# Patient Record
Sex: FEMALE | Race: WHITE
[De-identification: names, ages, dates, MRNs, and addresses within clinical notes are randomized per-mention and may not be internally consistent; named-entity substitution may affect disease eponyms.]

---

## 2017-09-19 ENCOUNTER — HOSPITAL ENCOUNTER (OUTPATIENT)
Dept: HOSPITAL 84 - OBSVTIME | Age: 57
LOS: 2 days | Discharge: HOME | End: 2017-09-21
Attending: INTERNAL MEDICINE
Payer: MEDICARE

## 2017-09-19 VITALS — DIASTOLIC BLOOD PRESSURE: 58 MMHG | SYSTOLIC BLOOD PRESSURE: 126 MMHG

## 2017-09-19 VITALS — SYSTOLIC BLOOD PRESSURE: 130 MMHG | DIASTOLIC BLOOD PRESSURE: 70 MMHG

## 2017-09-19 VITALS — DIASTOLIC BLOOD PRESSURE: 50 MMHG | SYSTOLIC BLOOD PRESSURE: 102 MMHG

## 2017-09-19 VITALS — DIASTOLIC BLOOD PRESSURE: 49 MMHG | SYSTOLIC BLOOD PRESSURE: 118 MMHG

## 2017-09-19 VITALS — DIASTOLIC BLOOD PRESSURE: 71 MMHG | SYSTOLIC BLOOD PRESSURE: 94 MMHG

## 2017-09-19 DIAGNOSIS — I25.119: Primary | ICD-10-CM

## 2017-09-19 DIAGNOSIS — Z01.812: ICD-10-CM

## 2017-09-19 DIAGNOSIS — R06.02: ICD-10-CM

## 2017-09-19 DIAGNOSIS — E11.9: ICD-10-CM

## 2017-09-19 DIAGNOSIS — R07.9: ICD-10-CM

## 2017-09-19 DIAGNOSIS — I10: ICD-10-CM

## 2017-09-19 DIAGNOSIS — E78.5: ICD-10-CM

## 2017-09-19 DIAGNOSIS — F17.200: ICD-10-CM

## 2017-09-19 LAB
ANION GAP SERPL CALC-SCNC: 8 MMOL/L (ref 8–16)
BASOPHILS NFR BLD AUTO: 0.6 % (ref 0–2)
BUN SERPL-MCNC: 24 MG/DL (ref 7–18)
CALCIUM SERPL-MCNC: 9.1 MG/DL (ref 8.5–10.1)
CHLORIDE SERPL-SCNC: 105 MMOL/L (ref 98–107)
CO2 SERPL-SCNC: 31.4 MMOL/L (ref 21–32)
CREAT SERPL-MCNC: 1.6 MG/DL (ref 0.6–1.3)
EOSINOPHIL NFR BLD: 1.9 % (ref 0–7)
ERYTHROCYTE [DISTWIDTH] IN BLOOD BY AUTOMATED COUNT: 14.9 % (ref 11.5–14.5)
GLUCOSE SERPL-MCNC: 252 MG/DL (ref 74–106)
HCT VFR BLD CALC: 34 % (ref 36–48)
HGB BLD-MCNC: 11.1 G/DL (ref 12–16)
IMM GRANULOCYTES NFR BLD: 0.4 % (ref 0–5)
LYMPHOCYTES NFR BLD AUTO: 29.1 % (ref 15–50)
MCH RBC QN AUTO: 32.2 PG (ref 26–34)
MCHC RBC AUTO-ENTMCNC: 32.6 G/DL (ref 31–37)
MCV RBC: 98.6 FL (ref 80–100)
MONOCYTES NFR BLD: 9 % (ref 2–11)
NEUTROPHILS NFR BLD AUTO: 59 % (ref 40–80)
OSMOLALITY SERPL CALC.SUM OF ELEC: 291 MOSM/KG (ref 275–300)
PLATELET # BLD: 124 10X3/UL (ref 130–400)
PMV BLD AUTO: 9.8 FL (ref 7.4–10.4)
POTASSIUM SERPL-SCNC: 4.4 MMOL/L (ref 3.5–5.1)
RBC # BLD AUTO: 3.45 10X6/UL (ref 4–5.4)
SODIUM SERPL-SCNC: 140 MMOL/L (ref 136–145)
WBC # BLD AUTO: 7 10X3/UL (ref 4.8–10.8)

## 2017-09-19 PROCEDURE — 92920 PRQ TRLUML C ANGIOP 1ART&/BR: CPT

## 2017-09-19 PROCEDURE — 92978 ENDOLUMINL IVUS OCT C 1ST: CPT

## 2017-09-19 PROCEDURE — 93458 L HRT ARTERY/VENTRICLE ANGIO: CPT

## 2017-09-19 NOTE — HEMODYNAMI
PATIENT:JASMIN VILLAVICENCIO                                    MEDICAL RECORD: [id number]
: 60                                            [id number]                                       ADMISSION DATE: 17
 
 
 Generatedon:20178:27
Patient name: JASMIN VILLAVICENCIO Patient #: [id number] Visit #: [id number] SSN: [id number]
- :
1960 Date of study: 2017
Page: Of
Hemodynamic Procedure Report
****************************
Patient Data
Patient Demographics
Procedure consent was obtained
First Name: JASMIN          Gender: Female
Last Name: SAUD           : 1960
Patient #: [id number]       Age: 57 year(s)
Visit #: [id number]       Race: 
SSN: 
Accession #:
[id number]
Additional ID: [id number]
Contact details
Address: [address] [place] [address]     Phone: [phone]
State: [place]
City: [place]
Zip code: [address]
Past Medical History
Allergies
Allergen        Reaction        Date         Comments
Reported
Other allergy                   2017    Aleve, Hydrocodone
Admission
Admission Data
Admission Date: 2017   Admission Time: 4:01
Arrival Date: 2017     Arrival Time: 4:01
Admit Source: Other         Insurance Payor: Medicare
Room #: D.2119
Lab Results
Lab Result Date: 2017  Lab Result Time: 0:00
Biochemistry
Name         Units    Result                Min      Max
BUN          mg/dl    24       --(----)-*   7        18
Creatinine   mg/dl    1.6      --(----)-*   0.6      1.3
CBC
Name         Units    Result                Min      Max
Hemoglobin   g/dl     11.1     *-(----)--   13.5     17.5
Procedure
Procedure Types
Cath Procedure
PCI Procedure
Coronary Stent Initial
Miscellaneous Procedures
Moderate Sedation up to 30 minutes
Procedure Description
Procedure Date
Procedure Date: 2017
Procedure Start Time: 8:14
 
Procedure End Time: 8:25
Procedure Staff
Name                            Function
Partha Hoyt MD                Performing Physician
Angie Vasquez RT               Scrub
George Pitts RT                  Scrub
Krishna Chapa RN              Nurse
Kirsten Villarreal RT                    Monitor
Indication
Angina
Procedure Data
Cath Procedure
Fluoroscopy
Diagnostic fluoroscopy      Total fluoroscopy Time: 3.1
time: 3.1 min               min
Diagnostic fluoroscopy      Total fluoroscopy dose: 292
dose: 292 mGy               mGy
Contrast Material
Contrast Material Type                       Amount (ml)
Isovue 300                                   35
Entry Location
Entry     Primary  Successful  Side  Size  Upsize Upsize Entry    Closure Succes
sful  Closure
Location                             (Fr)  1 (Fr) 2 (Fr) Remarks  Device        
      Remarks
Femoral                        Left  6 Fr                         Exoseal
artery                               Short
Estimated blood loss: 5 ml
Procedure Complications
No complications
Procedure Medications
Medication           Administration Route Dosage
0.9% NaCl            I.V.                 100 ml/hr
Oxygen               NC                   2 l/min
Heparin Flush Bag    added to field       2 bags
(1000units/500ml NS)
Lidocaine 2%         added to field       20
Versed               I.V.                 1 mg
Fentanyl             I.V.                 50 mcg
Versed               I.V.                 1 mg
Fentanyl             I.V.                 50 mcg
Heparin Bolus        I.V.                 4000 units
Hemodynamics
Rest
HGB: 11.1 (g/dl) Heart Rate: 79 (bpm)
Snapshots
Pre Cath      Intra         NCS           Post Cath
Vital Signs
Time    Heart  Resp   SPO2 etCO2   EF0nusx NIBP (mmHg) Rhythm  Pain    Sedation
Rate   (ipm)  (%)  (mmHg)  (mmHg)                      Status  Level
(bpm)
8:00:46 78     10     100  0       0       146/74(104) NSR     0 (11)  10(A)
, No
pain
8:05:35 79     16     97   0       0       133/49(126) NSR     0 (11)  10(A)
, No
pain
8:10:13 80     16     98   0       0       93/63(72)   NSR     0 (11)  9(A)
, No
pain
 
8:15:31 78     14     98   0       0       121/46(64)  NSR     0 (11)  9(A)
, No
pain
8:20:19 80     14     97   0       0       114/41(62)  NSR     0 (11)  9(A)
, No
pain
8:25:06 80     16     99   0       0       113/44(66)  NSR     0 (11)  9(A)
, No
pain
Medications
Time    Medication       Route  Dose  Verified Delivered Reason          Notes  
Effectiveness
by       by
7:58:35 0.9% NaCl        I.V.   100   Krishna  Krishna   Per physician
ml/hr Eduard Chapa RN       RN
7:58:51 Oxygen           NC     2     Krishna  Krishna   Per physician
l/min Eduard Chapa RN       RN
7:59:11 Heparin Flush    added  2     Krishna  Krishna   used for
Bag              to     bags  Eduard Chapa   procedure
(1000units/500ml field        RN       RN
NS)
7:59:28 Lidocaine 2%     added  20ml  Krishna  Krishna   for local
to     vial  Eduard Chapa   anesthetic
field        RN       RN
8:04:24 Versed           I.V.   1 mg  Krishna  Krishna   for sedation
Eduard Chapa
RN       RN
8:04:35 Fentanyl         I.V.   50    Krishna  Krishna   for sedation
mcg   Eduard Chapa
RN       RN
8:14:47 Versed           I.V.   1 mg  Krishna  Krishna   for sedation
Eduard Chapa
RN       RN
8:14:59 Fentanyl         I.V.   50    Krishna  Krishna   for sedation
mcg   Eduard Chapa
RN       RN
8:18:40 Heparin Bolus    I.V.   4000  Krishna  Krishna   for
units Eduard Chapa   anticoagulation
RN       RN
Procedure Log
Time     Note
7:42:06  Informed consent obtained and on chart
7:42:11  Diagnostic Cath Status : Elective
7:42:39  Indication : Angina
7:42:44  George Pitts RT(R) sent for patient. Start room use.
7:42:45  Time tracking: Regular hours
7:42:50  Plan of Care:Hemodynamics will remain stable., Cardiac
rhythm will remain stable., Comfort level will be
maintained., Respiratory function will remain
adequate., Patient/ family verbilizes understanding of
procedure., Procedure tolerated without complication.,
Recovers from procedure without complications..
7:45:26  Admit Source: Other
7:45:34  Arrival Date: 2017 4:01:00 AM
7:45:45  Insurance Payor : Medicare
7:46:37  Lab Result : Hemoglobin 11.1 g/dl
7:46:37  Lab Result : Creatinine 1.6 mg/dl
7:46:37  Lab Result : BUN 24 mg/dl
 
7:53:43  Patient received from Med II to CCL 1 Alert and
oriented. Tansferred to table in Supine position.
7:53:44  Warm blankets applied, and lili hugger turned on for
patient comfort.
7:53:45  Correct patient and procedure confirmed by team.
7:53:45  ECG and BP/O2 sat monitors applied to patient.
7:58:35  0.9% NaCl 100 ml/hr I.V. was administered by Krishna Chapa RN; Per physician;
7:58:51  Oxygen 2 l/min NC was administered by Krishna Chapa
RN; Per physician;
7:59:11  Heparin Flush Bag (1000units/500ml NS) 2 bags added to
field was administered by Krishna Chapa RN; used for
procedure;
7:59:28  Lidocaine 2% 20ml vial added to field was administered
by Krishna Chapa RN; for local anesthetic;
7:59:47  Vital chart was started
8:00:50  Baseline sample Acquired.
8:00:55  Rhythm: sinus rhythm
8:00:56  Full Disclosure recording started
8:01:07  H&P Date Dictated: 2017 Within 30 days and on
chart., H&P Addendum completed by physician on day of
procedure. (MUST COMPLETE FOR ALL OUTPATIENTS).
8:01:15  Pre-procedure instructions explained to patient.
8:01:16  Pre-op teaching completed and patient verbalized
understanding.
8:01:24  Family in patients room.
8:01:25  Patient NPO since Midnight.
8:01:27  Is the patient allergic to Iodine/contrast media? No.
8:01:29  Is patient on blood thinner?Yes
8:01:31  **ACC** The patient was administered the following
blood thiners within the last 24 hours: **ACC**Plavix
8:01:34  Patient diabetic? Yes.
8:01:36  If diabetic: On Metformin? No
8:01:39  Previous problem with sedation/anesthesia? No ?
8:01:42  Snore? Yes
8:01:48  Sleep apnea? Yes
8:01:49  Deviated septum? No
8:01:50  Opens mouth fully? Yes
8:01:50  Sticks out tongue? Yes
8:01:55  Airway obstruction? Yes COPD
8:02:13  Dentures? No ?
8:02:16  Pre procedure: left dorsailis pedis pulse 1+ Palpable,
but thready & weak; easily obliterated
8:02:24  Patient pain scale 0/10 ?.
8:02:29  IV patent on arrival in right forearm with 0.9% NaCl
at [facility].
8:02:42  Lab results completed and on chart.
8:02:45  Left groin area was prepped with chlora-prep and
draped in sterile fashion
8:03:43  Pt's abdomen was taped back for groin access.
8:03:45  Alarms reviewed by R. N.
8:03:46  Sharps counted by scrub and verified by R.N.
8:03:49  --------ALL STOP TIME OUT------
8:03:50  Final Timeout: patient, procedure, and site verified
with staff and physician. All members of the team are
in agreement.
8:03:54  Left groin site verified by team.
8:03:57  Physical assessment completed. ASA score P 2 - A
patient with mild systemic disease as per Partha Hoyt MD.
 
8:04:00  Sedation plan: IV Moderate Sedation Versed, Fentanyl
8:04:24  Versed 1 mg I.V. was administered by Krishna Chapa
RN; for sedation;
8:04:35  Fentanyl 50 mcg I.V. was administered by Krishna Chapa RN; for sedation;
8:06:29  Use device set Femoral PCI
8:06:31  Acist Syringe opened to sterile field.
8:06:31  Acist Hand Control opened to sterile field.
8:06:31  Bag Decanter opened to sterile field.
8:06:32  Medline Cath Pack opened to sterile field.
8:06:32  Terumo 6Fr [place] Sheath opened to sterile field.
8:06:32  St Hany 260cm J .035 wire opened to sterile field.
8:06:33  Merit BasixCompak Inflation Kit opened to sterile
field.
8:06:33  Acist Manifold opened to sterile field.
8:06:34  Tegaderm 4 x 4 opened to sterile field.
8:09:58  Zero performed for pressure channel P1
8:10:06  Zero performed for pressure channel P1
8:10:22  PCI Cath status Elective
8:14:37  Chicas Whisper J 300cm 0.014 guide wire opened to
sterile field.
8:14:38  Cordis 6FR XBLAD 4.0 guide catheter opened to sterile
field.
8:14:41  Procedure started.
8:14:46  Local anesthetic to left femerol artery with Lidocaine
2% by Partha Hoyt MD.**INITIAL ACCESS ONLY**
8:14:47  Versed 1 mg I.V. was administered by Krishna Chapa RN; for sedation;
8:14:55  A 6 Fr Short sheath was inserted into the Left Femoral
artery
8:14:59  Fentanyl 50 mcg I.V. was administered by Krishna Chapa RN; for sedation;
8:16:27  6 Fr xblad 4 guide catheter was inserted over the wire
8:17:12  whisper wire advanced.
8:18:40  Heparin Bolus 4000 units I.V. was administered by
Krishna Chapa RN; for anticoagulation;
8:18:54  Wire advanced across lesion.
8:20:39  Inflation Number: 1 A Rakesh OTW 3.0 x 08 stent was
prepped and advanced across the Prox CX. The stent was
deployed at 15 FERN for 0:10 (min:sec).
8:21:16  Wire redirected to lad.
8:22:15  Inflation number: 1 The stent balloon was then
re-inflated across the Prox LAD to 11 FERN for 0:10
(min:sec).
8:22:20  Stent catheter was removed intact over wire.
8:22:20  Wire removed.
8:22:21  Guide catheter removed.
8:22:33  Cordis 6Fr Exoseal opened to sterile field.
8:22:45  Sheath removed intact; hemostasis achieved with
Exoseal to the Left Femoral artery.
8:22:47  Procedure ended.(Physican Out)
8:23:31  Fluoroscopy time 03.10 minutes.
8:23:35  Flurop Dose total: 292
8:23:35  Fluoroscopy dose: 292 mGy
8:23:40  Contrast amount:Isovue 300 35ml.
8:23:42  Sharps counted by scrub and verified by R.N.
8:23:43  Insertion/operative site no bleeding no hematoma.
8:23:46  Post-op/insertion site Left Femoral artery dressed
using a 4 x 4 and Tegaderm.
8:23:50  Post left femerol artery:stable
 
8:23:52  Post Procedure Pulses reassessed and unchanged
8:23:58  Post procedure rhythm: unchanged.
8:24:01  Estimated blood loss: 5 ml
8:24:02  Post procedure instruction explained to
patient.Patient verbalizes understanding.
8:24:03  Patient needs reinforcement of post procedure
teaching.
8:24:40  Procedure type changed to Cath procedure, PCI
procedure, Coronary Stent Initial, Miscellaneous
Procedures, Moderate Sedation up to 30 minutes
8:25:19  Procedure and supply charges have been captured,
reviewed, submitted and are correct.
8:25:24  Procedure Complication : No complications
8::41  Vital chart was stopped
8:25:41  See physician's report for complete and final results.
8:25:44  Report given to [facility].
8:25:47  Patient transfered to [facility] with Stretcher.
8:25:48  Procedure ended.
8:25:48  Full Disclosure recording stopped
8:25:55  **ACC-PCI Only** Patient was given prescriptions, or
instructed by Partha Hoyt MD to start/continue the
following medications upon discharge: Plavix
8:25:57  End room use (Document Last)
Intervention Summary
Intervention Notes
Time    ActionType  Lesion and  Equipment Action#  Pressure  Duration
Attributes  Used
8:20:39 Place stent Prox CX     Biggs OTW  1        15        00:10
3.0 x 08
stent
8:22:15 Reinflate   Prox LAD    Rakesh OTW  1        11        00:10
stent                   3.0 x 08
balloon                 stent
Device Usage
Item Name   Manufacture  Quantity  Catalog      Hospital Part     Current Minima
l Lot# /
Number       Charge   Number   Stock   Stock   Serial#
Code
Acist       Acist        1         [id number]        534168   030274   535705  20
Syringe     Medical
[employer]
Acist Hand  Acist        1         83127        142013   085608   891765  5
Control     Medical
System[employer] Inc
Bag         Microtek     1         [id number]        961047   95010    733945  5
[employer]
Medline     Cardinal     1         [id number]    511102   82576    861209  5
Cath Pack   Health
Terumo 6Fr  Terumo       1         [id number]       401124   088690   493159  40
[place]
Sheath
St Hany     St Hany      1         475717       130858   106350   050013  30
260cm J
.035 wire
Merit       Merit        1         [id number]       168043   054658   358806  15
[employer]
Inflation
Kit
Acist       Acist        1         77532        482191   685612   633681  5
[employer]    Medical
 
Systems Inc
Tegaderm 4  3M           1         1626W        118203   459804   386658  5
x 4
Chicas      Chicas       1         [id number]    931797   730875   377270  5
Whisper J   Vascular
300cm 0.014
guide wire
Cordis 6FR  Cardinal     1         [id number]     388287   490763   685836  3
XBLAD 4.0   Health
guide
catheter
Rakesh OTW    [employer]tronic    1         [id number]  397123   2533754  545466  5     
  [id number]
3.0 x 08
stent
Cordis 6Fr  Cardinal     1                 477785   261869   911721  10
[facility]     Health
[facility]
Stage           Time        Signature      Unsigned
Intra-Procedure 2017   Kirsten Villarreal
8:27:47 AM  RT(R)
Signatures
Monitor : Kirsten Villarreal RT   Signature :
______________________________
Date : ______________ Time :
______________
 
 
 
 
 
 
 
 
 
 
 
 
 
 
 
 
 
 
 
 
 
 
 
 
 
 
 
 
 
[place] [address] [facility] [place] [address]

## 2017-09-19 NOTE — HEMODYNAMI
PATIENT:JASMIN VILLAVICENCIO                                    MEDICAL RECORD: [id number]
: 60                                            LOCATION[facility]     D.2119
[id number]                                       ADMISSION DATE: 17
 
 
 Generatedon:201710:51
Patient name: JASMNI VILLAVICENCIO Patient #: [id number] Visit #: [id number] SSN: 
: 1960 Date
of study: 2017
Page: Of
Hemodynamic Procedure Report
****************************
Patient Data
Patient Demographics
Procedure consent was obtained
First Name: JASMIN          Gender: Female
Last Name: SAUD           : 1960
Patient #: [id number]       Age: 57 year(s)
Visit #: [id number]       Race: Unknown
Accession #:
[id number]
Additional ID: [id number]
Contact details
Address: [address] [place] [address]     Phone: [phone]
State: [place]
City: [place]
Zip code: [address]
Past Medical History
Allergies
Allergen        Reaction        Date         Comments
Reported
Other allergy                   2017    Aleve, Hydrocodone
Admission
Admission Data
Admission Date: 2017   Admission Time: 4:01
[facility]
Lab Results
Lab Result Date: 2017  Lab Result Time: 0:00
Biochemistry
Name         Units    Result                Min      Max
Creatinine   mg/dl    1.6      --(----)-*   0.6      1.3
CBC
Name         Units    Result                Min      Max
Hemoglobin   g/dl     11.1     *-(----)--   13.5     17.5
Procedure
Procedure Types
Cath Procedure
Diagnostic Procedure
[facility] w/Coronaries
FFR/IVUS
Intra-Coronary IVUS Initial
PCI Procedure
Coronary Stent Initial
Miscellaneous Procedures
Moderate Sedation up to 30 minutes
Procedure Description
Procedure Date
Procedure Date: 2017
 
Procedure Start Time: 10:28
Procedure End Time: 10:51
Procedure Staff
Name                            Function
Partha Hoyt MD                Performing Physician
Jefferson Gaspar RT                  Scrub
Frances Oden RN                  Nurse
Sneha Leo RT             Monitor
Procedure Data
Cath Procedure
Fluoroscopy
Diagnostic fluoroscopy      Total fluoroscopy Time: 4
time: 4 min                 min
Diagnostic fluoroscopy      Total fluoroscopy dose: 616
dose: 616 mGy               mGy
Contrast Material
Contrast Material Type                       Amount (ml)
Isovue 300                                   94
Entry Location
Entry     Primary  Successful  Side  Size  Upsize Upsize Entry    Closure Succes
sful  Closure
Location                             (Fr)  1 (Fr) 2 (Fr) Remarks  Device        
      Remarks
Femoral                        Right 5 Fr  6 Fr                   Exoseal
artery                                     Short
Estimated blood loss: 10 ml
Diagnostic catheters
Device Type               Used For           End Catheter
Placement
Cordis 5Fr Pigtail        LV Angiography
Catheter (MP)
Cordis 5Fr JL 4.0         Left Coronary
Catheter (MP)             Angiography
Cordis 5Fr 3DRC Catheter  Right Coronary
(MP)                      Angiography
Procedure Complications
No complications
Procedure Medications
Medication           Administration Route Dosage
Oxygen               NC                   2 l/min
0.9% NaCl            I.V.                 100 ml/hr
Lidocaine 2%         added to field       20
Heparin Flush Bag    added to field       2 bags
(1000units/500ml NS)
Versed               I.V.                 1 mg
Fentanyl             I.V.                 50 mcg
Versed               I.V.                 1 mg
Fentanyl             I.V.                 50 mcg
Fentanyl             I.V.                 50 mcg
Heparin Bolus        I.V.                 4000 units
Plavix               P.O.                 75 mg
Hemodynamics
Rest
HGB: 11.1 (g/dl) Heart Rate: 79 (bpm)
Snapshots
Pre Cath      Intra         NCS           Post Cath
Vital Signs
Time     Heart  Resp   SPO2 NIBP (mmHg) Rhythm  Pain    Sedation
Rate   (ipm)  (%)                      Status  Level
(bpm)
 
10:11:43 76     14     99   119/73(107) NSR     0 (11)  10(A)
, No
pain
10:16:42 79     19     100  134/75(112) NSR     0 (11)  10(A)
, No
pain
10:20:54 78     16     99   112/68(104) NSR     0 (11)  10(A)
, No
pain
10:25:04 80     17     96   106/64(80)  NSR     0 (11)  10(A)
, No
pain
10:29:10 81     14     98   98/68(91)   NSR     0 (11)  9(A)
, No
pain
10:33:14 84     15     95   91/73(85)   NSR     0 (11)  9(A)
, No
pain
10:39:14 84     15     97   104/85(101) NSR     0 (11)  9(A)
, No
pain
10:43:18 76     17     98   113/83(107) NSR     0 (11)  9(A)
, No
pain
10:47:26 86     17     98   123/77(92)  NSR     0 (11)  10(A)
, No
pain
10:51:34 85     9      99   126/83(114) NSR     0 (11)  10(A)
, No
pain
Medications
Time     Medication       Route  Dose  Verified Delivered Reason          Notes 
   Effectiveness
by       by
10:10:38 Oxygen           NC     2     Partha  Patriciaie    used for
l/min Lai Oden RN   procedure
10:10:54 0.9% NaCl        I.V.   100   Partha  Buffie    Per physician
ml/hr Lai Oden RN
10:11:07 Lidocaine 2%     added  20ml  Parthastephen Chavis   for local
to     vial  Lai Hoyt MD  anesthetic
field
10:11:14 Heparin Flush    added  2     Partha  Partha   used for
Bag              to     bags  Lai Hoyt MD  procedure
(1000units/500ml field
NS)
10:23:04 Versed           I.V.   1 mg  Partha Daniels    for sedation
Lai Oden RN
10:23:11 Fentanyl         I.V.   50    Partha Gomezie    for sedation
mcg   Lai Oden RN
10:32:51 Versed           I.V.   1 mg  Partha Daniels    for sedation
Lai Oden RN
10:32:54 Fentanyl         I.V.   50    Partha Daniels    for sedation
mcg   Lai Oden RN
10:36:36 Fentanyl         I.V.   50    Partha Gomezie    for sedation
mcg   Lai Oden RN
10:37:21 Heparin Bolus    I.V.   4000  Partha Daniels    for             verifi
ed
units Lai Oden RN   anticoagulation with dr hoyt
10:49:15 Plavix           P.O.   75 mg Partha Daniels    for
 
Lai Oden RN   antiplatelet
therapy
Procedure Log
Time     Note
9:57:06  Informed consent obtained and on chart
9:57:27  Diagnostic Cath status Elective
9:57:29  Frances Oden RN sent for patient. Start room use.
9:57:30  Time tracking: Regular hours
9:57:33  Plan of Care:Hemodynamics will remain stable., Cardiac
rhythm will remain stable., Comfort level will be
maintained., Respiratory function will remain
adequate., Patient/ family verbilizes understanding of
procedure., Procedure tolerated without complication.,
Recovers from procedure without complications..
9:58:06  H&P Date Dictated: 2017 Within 30 days and on
chart..
10:06:01 Patient received from Med II to [facility] 2 Alert and
oriented. Tansferred to table in Supine position.
10:06:06 Warm blankets applied, and lili hugger turned on for
patient comfort.
10:06:07 ECG and BP/O2 sat monitors applied to patient.
10::09 Pre-procedure instructions explained to patient.
10::09 Pre-op teaching completed and patient verbalized
understanding.
10:06:11 Family in patients room.
10:06:12 Patient NPO since Midnight.
10::41 Patient allergic to Other allergyAleve, Hydrocodone
10::43 Is the patient allergic to Iodine/contrast media? No.
10::44 Was the patient premedicated? No
10:09:46 Is patient on blood thinner?Yes
10::50 **ACC** The patient was administered the following
blood thiners within the last 24 hours: **ACC**Plavix
10:09:54 Patient diabetic? Yes.
10:09:58 If diabetic: On Metformin? No
10:10:01 Previous problem with sedation/anesthesia? No ?
10:10:04 Snore? Yes
10:10:05 Sleep apnea? Yes
10:10:06 Deviated septum? No
10:10:07 Opens mouth fully? Yes
10:10:07 Sticks out tongue? Yes
10:10:12 Airway obstruction? Yes copd
10:10:21 Dentures? Yes in tight
10:10:38 Oxygen 2 l/min NC was administered by Frances Oden RN;
used for procedure;
10:10:41 Vital chart was started
10:10:54 0.9% NaCl 100 ml/hr I.V. was administered by Frances Oden RN; Per physician;
10:11:06 Pre procedure: right dorsailis pedis pulse 2+ Normal;
easily identifiable; not easily obliterated
10:11:07 Lidocaine 2% 20ml vial added to field was administered
by Partha Hoyt MD; for local anesthetic;
10:11:08 Pre procedure: left dorsailis pedis pulse 2+ Normal;
easily identifiable; not easily obliterated
10:11:10 Patient pain scale 0/10 ?.
10:11:14 Heparin Flush Bag (1000units/500ml NS) 2 bags added to
field was administered by Partha Hoyt MD; used for
procedure;
10:11:18 IV patent on arrival in right wrist with 0.9% NaCl at
[facility].
10:13:42 Lab Result : Creatinine 1.6 mg/dl
 
10:13:42 Lab Result : Hemoglobin 11.1 g/dl
10:15:07 Lab results completed and on chart.
10:15:13 Right groin area was prepped with chlora-prep and
draped in sterile fashion
10:15:14 Alarms reviewed by R. N.
10:15:14 Sharps counted by scrub and verified by R.N.
10:15:16 Use device set Femoral Dx
10:15:17 Acist Syringe opened to sterile field.
10:15:18 Bag Decanter opened to sterile field.
10:15:18 Medline Cath Pack opened to sterile field.
10:15:18 Terumo 5Fr [place] Sheath opened to sterile field.
10:15:19 St Hany 260cm J .035 wire opened to sterile field.
10:15:20 Acist Hand Control opened to sterile field.
10:15:20 Acist Manifold opened to sterile field.
10:15:21 Diagnostic Infinity 5Fr Multipack catheter opened to
sterile field.
10:15:21 Tegaderm 4 x 4 opened to sterile field.
10:17:31 Baseline sample Acquired.
10:20:25 Final Timeout: patient, procedure, and site verified
with staff and physician. All members of the team are
in agreement.
10:20:27 Right groin site verified by team.
10:20:30 Physical assessment completed. ASA score P 2 - A
patient with mild systemic disease as per Partha Hoyt MD.
10:20:33 Sedation plan: IV Moderate Sedation Versed, Fentanyl
10:23:04 Versed 1 mg I.V. was administered by Frances Oden RN;
for sedation;
10:23:11 Fentanyl 50 mcg I.V. was administered by Frances Oden
RN; for sedation;
10:24:22 Zero performed for pressure channel P1
10:27:36 PERCUTANEOUS ENTRY 19GA needle opened to sterile
field.
10:28:49 Procedure started.
10:28:49 Full Disclosure recording started
10:28:59 Local anesthetic to right femoral artery with
Lidocaine 2% by Partha Hoyt MD.**INITIAL ACCESS
ONLY**
10:31:58 A 5 Fr sheath was inserted into the Right Femoral
artery
10:32:40 A Cordis 5Fr Pigtail Catheter (MP) was advanced over
the wire and used for LV Angiography.
10:32:51 Versed 1 mg I.V. was administered by Frances Oden RN;
for sedation;
10:32:54 Fentanyl 50 mcg I.V. was administered by Frances Oden
RN; for sedation;
10:33:27 LV gram done using RAMIRES
10:33:36 EF : 60 %
10:33:39 Injector settings: Ml/sec: 10, Volume: 20,
10:33:40 Catheter removed.
10:33:56 A Cordis 5Fr JL 4.0 Catheter (MP) was advanced over
the wire and used for Left Coronary Angiography.
10:35:13 Catheter removed.
10:35:21 A Cordis 5Fr 3DRC Catheter (MP) was advanced over the
wire and used for Right Coronary Angiography.
10:35:35 Terumo 6Fr [place] Sheath opened to sterile field.
10:35:36 Merit BasixCompak Inflation Kit opened to sterile
field.
10:35:36 Chicas Whisper J 300cm 0.014 guide wire opened to
sterile field.
 
10:36:11 Volcano Platinum [place]ag[place] IVUS Catheter opened to
sterile field.
10:36:36 Fentanyl 50 mcg I.V. was administered by Frances Oden
RN; for sedation;
10:37:21 Heparin Bolus 4000 units I.V. was administered by
Frances Oden RN; for anticoagulation; verified with dr hoyt
10:37:38 Medtronic Launcher 6Fr AR 1.0 guide catheter opened to
sterile field.
10:37:46 Catheter removed.
10:37:53 Sheath upsized to a 6 Fr Short.
10:37:58 6 Fr AR 1.0 guide catheter was inserted over the wire
10:38:01 Guide Catheter removed. pressure damping.
10:38:06 Medtronic Launcher 6Fr AR 1.0 SH guide catheter opened
to sterile field.
10:38:31 6 Fr AR 1.0 SH guide catheter was inserted over the
wire
10:38:56 Whisper wire advanced.
10:39:26 IVUS catheter advanced over wire.
10:39:29 IVUS pass to RCA lesion performed.
10:39:37 Procedure type changed to Cath procedure, Diagnostic
procedure, LHC, [facility] w/Coronaries, FFR/IVUS,
Intra-Coronary IVUS Initial, PCI procedure, Coronary
Stent Initial, Miscellaneous Procedures, Moderate
Sedation up to 30 minutes
10:43:01 IVUS catheter removed over wire.
10:45:05 Inflation Number: 1 A Promus Premier OTW 3.0 x 38
stent was prepped and advanced across the Prox RCA.
The stent was deployed at 13 FERN for 0:09 (min:sec).
10:45:24 Stent catheter was removed intact over wire.
10:45:25 Wire removed.
10:45:25 Guide catheter removed.
10:45:38 Sheath removed intact; hemostasis achieved with
Exoseal to the Right Femoral artery.
10:45:45 Cordis 6Fr Exoseal opened to sterile field.
10:45:47 Procedure ended.(Physican Out)
10:49:03 Fluoroscopy time 04.00 minutes.
10:49:06 Fluoroscopy dose: 616 mGy
10:49:06 Flurop Dose total: 616
10:49:09 Contrast amount:Isovue 300 94ml.
10:49:10 Sharps counted by scrub and verified by R.N.
10:49:11 Insertion/operative site no bleeding no hematoma.
10:49:13 Post-op/insertion site Right Femoral artery dressed
using a 4 x 4 and Tegaderm.
10:49:15 Plavix 75 mg P.O. was administered by Frances Oden RN;
for antiplatelet therapy;
10:49:17 Post right femoral artery:stable, clean and dry
10:49:19 Post Procedure Pulses reassessed and unchanged
10:49:21 Post-procedure physical assessment completed. ASA
score P 2 - A patient with mild systemic disease as
per Partha Hoyt MD.
10:49:23 Post procedure rhythm: unchanged.
10:49:25 Estimated blood loss: 10 ml
10:49:31 Post procedure instruction explained to
patient.Patient verbalizes understanding.
10:49:31 Patient needs reinforcement of post procedure
teaching.
10:49:37 Procedure Complication : No complications
10:49:39 See physician's report for complete and final results.
10:50:38 Procedure and supply charges have been captured,
 
reviewed, submitted and are correct.
10:51:19 Vital chart was stopped
10:51:22 Report given to PCU.
10:51:25 Patient transfered to PCU with Bed.
10:51:31 Procedure ended.
10:51:31 Full Disclosure recording stopped
10:51:34 End room use (Document Last)
Intervention Summary
Intervention Notes
Time     ActionType  Lesion and  Equipment Action#  Pressure  Duration
Attributes  Used
10:45:05 Place stent Prox RCA    Promus    1        13        00:09
Premier
OTW 3.0 x
38 stent
Device Usage
Item Name    Manufacture  Quantity  Catalog Number  Hospital Part    Current Min
imal Lot# /
Charge   Number  Stock   Stock   Serial#
Code
Acist        Acist        1         [id number]           568727   159542  914072  20
Syringe      Medical
Systems Inc
Bag Decanter Microtek     1         2002S           507218   47086   329753  5
Medical Inc.
Medline Cath Cardinal     1         [id number]       240575   66054   270742  5
Pack         Health
Terumo 5Fr   Terumo       1         [id number]          362166   233767  255556  40
[place]
Sheath
St Hany      St Hany      1         780287          017276   135075  198928  30
260cm J .035
wire
Acist Hand   Acist        1         67001           332468   915774  322592  5
Control      Medical
Systems Inc
Acist        Acist        1         57247           074845   134500  723411  5
Manifold     Medical
Systems Inc
Diagnostic   Cardinal     1         [id number]          929960   80905   296072  30
Infinity 5Fr Health
Multipack
catheter
Tegaderm 4 x 3M           1         1626W           511507   544554  498491  5
4
PERCUTANEOUS [facility] 1         [id number]          996522           874717  5
ENTRY 19GA
needle
Cordis 5Fr   Cardinal     1                                          418762  5
Pigtail      Health
Catheter
(MP)
Cordis 5Fr   Cardinal     1                                          234351  5
JL 4.0       Health
Catheter
(MP)
Cordis 5Fr   Cardinal     1                                          292782  5
3DRC         Health
Catheter
(MP)
 
Terumo 6Fr   Terumo       1         [id number]          416316   850129  150013  40
[place]
Sheath
Merit        Merit        1         [id number]          075248   288391  038895  15
[facility]  Medical
Inflation
Kit
Chicas       Chicas       1         [id number]       707666   135692  208839  5
Whisper J    Vascular
300cm 0.014
guide wire
Volcano      [place]      1         03401A          325658   572826  165430  8
[place]
Eagleye IVUS
Catheter
Medtronic    Medtronic    1         [id number]         745556   31276   010313  1
Launcher 6Fr
AR 1.0 guide
catheter
Medtronic    Medtronic    1         [id number]       874219   71132   717527  1
Launcher 6Fr
AR 1.0 SH
guide
catheter
Promus       [place]       1         [id number]  064596           734494  5  
     [id number]
Premier OTW  Scientific
3.0 x 38
stent
Cordis 6Fr   Cardinal     1                    991351   787106  935078  10
[facility]      Health
Signature Audit Maria Stein
Stage           Time        Signature      Unsigned
Intra-Procedure 2017   Sneha
10:51:47 AM Counts RT(R)
Signatures
Monitor : Sneha     Signature :
Counts RT               ______________________________
Date : ______________ Time :
______________
 
 
 
 
 
 
 
 
 
 
 
 
 
 
 
[address] [place] [address]

## 2017-09-19 NOTE — NUR
ARRIVED TO FLOOR AND PLACED ON TELEMETRY.  ORIENTED TO UNIT, CALL LIGHT IN
REACH.  CONSENTS OBTAINED.  NPO AT THIS TIME.  WILL CONTINUE TO MONITOR.  SEE
NURSE ASSESSMENT.

## 2017-09-19 NOTE — OP
PATIENT NAME:  JASMIN VILLAVICENCIO                             MEDICAL RECORD: [id number]
:60                                             LOCATION:D.M2     D.2119
                                                         ADMISSION DATE:17
SURGEON:  RAJ RUVALCABA MD             
 
 
DATE OF OPERATION:  2017
 
PROCEDURE:
1.  PTCA stent to RCA.
2.  Intravascular ultrasound.
3.  Left heart catheterization.
4.  Selective coronary angiography.
5.  Left ventriculogram.
 
INDICATION:  Angina and coronary artery disease.
 
PROCEDURE IN DETAIL:  After informed consent was obtained and after detailed
explanation of risks, benefits as well as alternative therapies, the patient
elected to proceed with angiogram and angioplasty.  The right radial area was
prepped and draped in normal sterile fashion.  Right radial artery was
cannulated via modified Seldinger technique with placement of 6-[place] sheath. 
All catheters were exchanged through this sheath.
 
FINDINGS:  The left ventriculogram was performed in standard 30-degree RAMIRES view,
reveals preserved cardiac wall motion, ejection fraction 55%.
 
SELECTIVE CORONARY ANGIOGRAPHY:
1.  Left main showed no significant angiographic disease.
2.  Left anterior descending has previously placed stent.  This is widely patent
with no significant restenosis.  No disease elsewise throughout the LAD or its
branches.
3.  Left circumflex has 90% stenosis at the ostium.
4.  The right coronary has a long area of 80% stenosis confirmed by
intravascular ultrasound.
 
PTCA STENT OF THE RIGHT CORONARY ARTERY:  The stent used was 3.0x38 mm Promus. 
Result was 0% residual stenosis.
 
OVERALL IMPRESSION:  Successful percutaneous transluminal coronary angioplasty
stent of the right coronary artery going from a long area of 80% initial
stenosis to 0% residual stenosis.
 
PLAN:  PTCA stent of the left circumflex in the near future.
 
TRANSINT:[id number] Voice Confirmation ID: [id number] DOCUMENT ID: [id number]
                                           
                                           RAJ RUVALCABA MD             
 
 
CC:                                                             [id number]
DICTATION DATE: 17 1103     :     17 1124      ADM IN  
                                                                              
[address] [place] [address]

## 2017-09-19 NOTE — OP
PATIENT NAME:  JASMIN VILLAVICENCIO                             MEDICAL RECORD: [id number]
:60                                             LOCATION:D.M2     D.2119
                                                         ADMISSION DATE:17
SURGEON:  RAJ RUVALCABA MD             
 
 
DATE OF OPERATION:  2017
 
PROCEDURES:
1.  PTCA stent left circumflex.
2.  PTCA LAD.
3.  Selective coronary angiography.
 
INDICATIONS:  Angina and coronary artery disease.
 
PROCEDURE IN DETAIL:  After informed consent was obtained and after detailed
explanation of risks, benefits as well as alternative therapies, the patient
elected to proceed with angiogram and angioplasty.  The left femoral area was
prepped and draped in normal sterile fashion.  Left femoral artery was
cannulated via modified Seldinger technique with placement of 6-Tajik sheath. 
All catheters exchanged through this sheath.
 
FINDINGS:  The left circumflex has 90% stenosis at the ostium.  This was
addressed with a 3.0 x 8 mm Rakesh stent, this caused plaque shift into the LAD. 
The LAD was ballooned with a stent balloon.  Result was 0% residual throughout.
 
OVERALL IMPRESSION:  Successful percutaneous transluminal coronary angioplasty
stent of the left circumflex going from 90% initial stenosis to 0% residual
stenosis.
 
TRANSINT:[id number] Voice Confirmation ID: [id number] DOCUMENT ID: [id number]
                                           
                                           RAJ RUVALCABA MD             
 
 
 
 
 
 
 
 
 
 
 
 
 
 
 
 
 
CC:                                                             [id number]
DICTATION DATE: 17     :     17 09      ADM IN  
                                                                              
[address] [place] [id number]

## 2017-09-19 NOTE — NUR
PT AWAKE/ALERT/ORIENTED. C/O CHEST PRESSURE. WANTING PAIN MEDICATION AND THERE
ARE NO ORDERS FOR ANY. NOTED ALLERGIES TO HYDROCODONE AND MORPHINE. PAGE TO DR RUVALCABA FOR NEW ORDERS.

## 2017-09-19 NOTE — DS
PATIENT:JASMIN KNIGHT                     :60   MEDICAL RECORD: [id number]
 
                              DISCHARGE SUMMARY
                                                         
ADMISSION DATE:    17                       DISCHARGE DATE:             
 
 
DATE OF SERVICE:  2017.
 
DISCHARGE DIAGNOSES:
1.  Angina, unstable.
2.  Coronary artery disease.
3.  Percutaneous transluminal coronary angioplasty stent to right coronary
artery and left circumflex this admission.
 
HOSPITAL COURSE:  Mrs. Knight presents with unstable angina, found to have
2-vessel disease of the RCA and left circumflex, underwent successful PTCA stent
of above, had an uneventful postop course.  She was discharged home with the
addition of aspirin and Plavix to her medical regimen.  Will follow up with
[facility] in [facility] month.
 
TRANSINT:[id number] Voice Confirmation ID: [id number] DOCUMENT ID: [id number]
                                           
                                           RAJ RUVALCABA MD             
 
 
 
 
 
 
 
 
 
 
 
 
 
 
 
 
 
 
 
 
 
 
 
 
 
 
CC:                                                             [id number]
DICTATION DATE: 17     :     17 1009      ADM IN  
                                                                              
[facility]                                          
[address] [id number]

## 2017-09-20 VITALS — SYSTOLIC BLOOD PRESSURE: 123 MMHG | DIASTOLIC BLOOD PRESSURE: 59 MMHG

## 2017-09-20 VITALS — DIASTOLIC BLOOD PRESSURE: 51 MMHG | SYSTOLIC BLOOD PRESSURE: 117 MMHG

## 2017-09-20 VITALS — SYSTOLIC BLOOD PRESSURE: 115 MMHG | DIASTOLIC BLOOD PRESSURE: 50 MMHG

## 2017-09-20 VITALS — SYSTOLIC BLOOD PRESSURE: 119 MMHG | DIASTOLIC BLOOD PRESSURE: 64 MMHG

## 2017-09-20 VITALS — DIASTOLIC BLOOD PRESSURE: 55 MMHG | SYSTOLIC BLOOD PRESSURE: 105 MMHG

## 2017-09-20 VITALS — DIASTOLIC BLOOD PRESSURE: 64 MMHG | SYSTOLIC BLOOD PRESSURE: 118 MMHG

## 2017-09-20 NOTE — NUR
RESUMED CARE OF PT, LYING IN BED RESPIRATIONS EVEN AND UNLABORED ON ROOM AIR.
85 SR ON TELEMETRY.  EDDIE GROINS C/D/I WITH PEDAL PULSES PALPABLE.  REQUESTS
PAIN MEDICATION.  WILL CONTINUE TO MONITOR.  CALL LIGHT IN REACH.  SEE NURSE
ASSESSMEN.T

## 2017-09-20 NOTE — NUR
AFTER TAKING ONE DOSE OF IV DILAUDID, PT DEVELOPED EXTREME ITCHING. MEDICATED
WITH BENADRYL 25MG SIVP. TALKED IN DEPTH WITH PT ON WHY SHE SAYS SHE IS
ALLERGIC TO MORPHINE AND HYDROCODONE. PT NOW SAYING THAT SHE ISNT REALLY
ALLERGIC TOO THEM, JUST THAT IF SHE TAKES TOO MUCH SHE ACTS WEIRD. PT CHANGED
UP HER REASONS SEVERAL TIMES SO FINALLY DC'D THE DILAUDID WHICH OBVIOUSLY
CAUSED SEVERE ITCHING. DID NOT ALLOW PT TO THEN TAKE A DOSE OF IV MORPHINE
AFTER SHE STOPPED ITCHING FROM THE DILAUDID. WILL MONITOR.

## 2017-09-21 VITALS — SYSTOLIC BLOOD PRESSURE: 142 MMHG | DIASTOLIC BLOOD PRESSURE: 60 MMHG

## 2017-09-21 VITALS — DIASTOLIC BLOOD PRESSURE: 62 MMHG | SYSTOLIC BLOOD PRESSURE: 120 MMHG

## 2017-09-21 VITALS — DIASTOLIC BLOOD PRESSURE: 58 MMHG | SYSTOLIC BLOOD PRESSURE: 110 MMHG

## 2017-09-21 NOTE — NUR
ASSESSMENT COMPLETED. DENIES ANY PAIN AT PRESENT TIME.TELEMERTY SHOWS SR. O2
AT 2 L/M PER NC.DRSG TO RIGHT AND LEFT GROIN SOF WITH DRSG CLEAN AND DRY. PPP.
RIGHT FOREARM SL. WILL MONITOR

## 2019-01-28 ENCOUNTER — HOSPITAL ENCOUNTER (INPATIENT)
Dept: HOSPITAL 84 - D.ER | Age: 59
LOS: 5 days | Discharge: HOME | DRG: 177 | End: 2019-02-02
Attending: INTERNAL MEDICINE | Admitting: INTERNAL MEDICINE
Payer: MEDICARE

## 2019-01-28 VITALS
HEIGHT: 65 IN | BODY MASS INDEX: 34.69 KG/M2 | WEIGHT: 208.19 LBS | BODY MASS INDEX: 34.69 KG/M2 | BODY MASS INDEX: 34.69 KG/M2 | WEIGHT: 208.19 LBS | HEIGHT: 65 IN | BODY MASS INDEX: 34.69 KG/M2

## 2019-01-28 VITALS — SYSTOLIC BLOOD PRESSURE: 99 MMHG | DIASTOLIC BLOOD PRESSURE: 63 MMHG

## 2019-01-28 VITALS — SYSTOLIC BLOOD PRESSURE: 147 MMHG | DIASTOLIC BLOOD PRESSURE: 61 MMHG

## 2019-01-28 VITALS — SYSTOLIC BLOOD PRESSURE: 123 MMHG | DIASTOLIC BLOOD PRESSURE: 62 MMHG

## 2019-01-28 VITALS — SYSTOLIC BLOOD PRESSURE: 138 MMHG | DIASTOLIC BLOOD PRESSURE: 64 MMHG

## 2019-01-28 VITALS — SYSTOLIC BLOOD PRESSURE: 115 MMHG | DIASTOLIC BLOOD PRESSURE: 65 MMHG

## 2019-01-28 VITALS — DIASTOLIC BLOOD PRESSURE: 68 MMHG | SYSTOLIC BLOOD PRESSURE: 155 MMHG

## 2019-01-28 VITALS — DIASTOLIC BLOOD PRESSURE: 62 MMHG | SYSTOLIC BLOOD PRESSURE: 118 MMHG

## 2019-01-28 DIAGNOSIS — J69.0: Primary | ICD-10-CM

## 2019-01-28 DIAGNOSIS — N39.0: ICD-10-CM

## 2019-01-28 DIAGNOSIS — N17.9: ICD-10-CM

## 2019-01-28 DIAGNOSIS — N18.3: ICD-10-CM

## 2019-01-28 DIAGNOSIS — I50.9: ICD-10-CM

## 2019-01-28 DIAGNOSIS — J44.9: ICD-10-CM

## 2019-01-28 DIAGNOSIS — I25.10: ICD-10-CM

## 2019-01-28 DIAGNOSIS — K21.9: ICD-10-CM

## 2019-01-28 DIAGNOSIS — I82.432: ICD-10-CM

## 2019-01-28 DIAGNOSIS — J96.21: ICD-10-CM

## 2019-01-28 DIAGNOSIS — E11.22: ICD-10-CM

## 2019-01-28 NOTE — NUR
ASSUMED CARE OF PATIENT, SHE IS RESTING QUIETLY ON STRETCHER, MEDICATION AND NS
INFUSING PER ORDER, V/S STABLE LETTING HER REST.

## 2019-01-28 NOTE — NUR
ASSISTED PATIENT TO THE BATHROOM, GAVE HER A SANDWICH TRAY, SHE IS SITTING UP
ON THE STRETCHER WATCHING TV, CALL LIGHT WITHIN REACH.

## 2019-01-28 NOTE — NUR
PT NEEDING TO GOT TO THE BATHROOM, VERBALIZING HER NEEDS CLEARLY. SHE ASSISTED
ME IN GETTING INTO A WHEELCHAIR TO TAKE HER TO THE BATHROOM. PATIENT IS VERY
PLEASANT, STATES SHE DOESN'T REMEMBER WHAT HAPPENED FOR HER TO COME HER, BUT
REMEMBERS EVERYTHING SINCE BEING HERE. CALL LIGHT WITHIN REACH.

## 2019-01-28 NOTE — NUR
PT STATES SHE FEELS MUCH BETTER. HAS A DRESSING TO HER RIGHT BKA WHICH WAS DONE
10/2018, STATES SHE FELL IN 11/2018 OPENED INCISION UP AND IT HASN'T HEALED. PT
IS A DIABETIC, AND AT TIMES IS NONCOMPLIANCE, SHE LIVES WITH HER DAUGHTER.

## 2019-01-29 VITALS — DIASTOLIC BLOOD PRESSURE: 60 MMHG | SYSTOLIC BLOOD PRESSURE: 117 MMHG

## 2019-01-29 VITALS — DIASTOLIC BLOOD PRESSURE: 81 MMHG | SYSTOLIC BLOOD PRESSURE: 136 MMHG

## 2019-01-29 VITALS — DIASTOLIC BLOOD PRESSURE: 63 MMHG | SYSTOLIC BLOOD PRESSURE: 114 MMHG

## 2019-01-29 VITALS — DIASTOLIC BLOOD PRESSURE: 48 MMHG | SYSTOLIC BLOOD PRESSURE: 96 MMHG

## 2019-01-29 VITALS — DIASTOLIC BLOOD PRESSURE: 66 MMHG | SYSTOLIC BLOOD PRESSURE: 120 MMHG

## 2019-01-29 LAB
ALBUMIN SERPL-MCNC: 2.6 G/DL (ref 3.4–5)
ALP SERPL-CCNC: 79 U/L (ref 46–116)
ALT SERPL-CCNC: 25 U/L (ref 10–68)
AMPHETAMINES UR QL SCN: NEGATIVE QUAL
ANION GAP SERPL CALC-SCNC: 15.3 MMOL/L (ref 8–16)
APPEARANCE UR: CLEAR
BACTERIA #/AREA URNS HPF: (no result) /HPF
BARBITURATES UR QL SCN: NEGATIVE QUAL
BASOPHILS NFR BLD AUTO: 0.2 % (ref 0–2)
BENZODIAZ UR QL SCN: NEGATIVE QUAL
BILIRUB SERPL-MCNC: 0.02 MG/DL (ref 0.2–1.3)
BILIRUB SERPL-MCNC: NEGATIVE MG/DL
BUN SERPL-MCNC: 50 MG/DL (ref 7–18)
BZE UR QL SCN: NEGATIVE QUAL
CALCIUM SERPL-MCNC: 8.4 MG/DL (ref 8.5–10.1)
CANNABINOIDS UR QL SCN: NEGATIVE QUAL
CHLORIDE SERPL-SCNC: 97 MMOL/L (ref 98–107)
CK MB SERPL-MCNC: 1.9 U/L (ref 0–3.6)
CK MB SERPL-MCNC: 2 U/L (ref 0–3.6)
CK MB SERPL-MCNC: 2.4 U/L (ref 0–3.6)
CK MB SERPL-MCNC: 2.4 U/L (ref 0–3.6)
CK SERPL-CCNC: 303 UL (ref 21–215)
CK SERPL-CCNC: 388 UL (ref 21–215)
CK SERPL-CCNC: 458 UL (ref 21–215)
CK SERPL-CCNC: 567 UL (ref 21–215)
CO2 SERPL-SCNC: 25.2 MMOL/L (ref 21–32)
COLOR UR: YELLOW
CREAT SERPL-MCNC: 1.8 MG/DL (ref 0.6–1.3)
EOSINOPHIL NFR BLD: 1.4 % (ref 0–7)
ERYTHROCYTE [DISTWIDTH] IN BLOOD BY AUTOMATED COUNT: 15.1 % (ref 11.5–14.5)
GLOBULIN SER-MCNC: 5.2 G/L
GLUCOSE SERPL-MCNC: 141 MG/DL (ref 74–106)
GLUCOSE SERPL-MCNC: NEGATIVE MG/DL
HCT VFR BLD CALC: 29.6 % (ref 36–48)
HGB BLD-MCNC: 9.2 G/DL (ref 12–16)
IMM GRANULOCYTES NFR BLD: 0.2 % (ref 0–5)
IRON SERPL-MCNC: 13 UG/DL (ref 35–150)
KETONES UR STRIP-MCNC: NEGATIVE MG/DL
LYMPHOCYTES NFR BLD AUTO: 21.1 % (ref 15–50)
MCH RBC QN AUTO: 28.8 PG (ref 26–34)
MCHC RBC AUTO-ENTMCNC: 31.1 G/DL (ref 31–37)
MCV RBC: 92.5 FL (ref 80–100)
MONOCYTES NFR BLD: 9.8 % (ref 2–11)
NEUTROPHILS NFR BLD AUTO: 67.3 % (ref 40–80)
NITRITE UR-MCNC: NEGATIVE MG/ML
OPIATES UR QL SCN: POSITIVE QUAL
OSMOLALITY SERPL CALC.SUM OF ELEC: 280 MOSM/KG (ref 275–300)
PCP UR QL SCN: NEGATIVE QUAL
PH UR STRIP: 5 [PH] (ref 5–6)
PLATELET # BLD: 155 10X3/UL (ref 130–400)
PMV BLD AUTO: 10.1 FL (ref 7.4–10.4)
POTASSIUM SERPL-SCNC: 4.5 MMOL/L (ref 3.5–5.1)
PROT SERPL-MCNC: 7.8 G/DL (ref 6.4–8.2)
PROT UR-MCNC: (no result) MG/DL
RBC # BLD AUTO: 3.2 10X6/UL (ref 4–5.4)
RBC #/AREA URNS HPF: (no result) /HPF (ref 0–5)
SAO2 % BLD FROM PO2: 5 % (ref 15–55)
SODIUM SERPL-SCNC: 133 MMOL/L (ref 136–145)
SP GR UR STRIP: 1.01 (ref 1–1.02)
SQUAMOUS #/AREA URNS HPF: (no result) /HPF (ref 0–5)
TIBC SERPL-MCNC: 236 UG/DL (ref 260–445)
TROPONIN I SERPL-MCNC: 0.02 NG/ML (ref 0–0.06)
TROPONIN I SERPL-MCNC: 0.02 NG/ML (ref 0–0.06)
TROPONIN I SERPL-MCNC: 0.03 NG/ML (ref 0–0.06)
TROPONIN I SERPL-MCNC: 0.08 NG/ML (ref 0–0.06)
UIBC SERPL-MCNC: 223 UG/DL (ref 150–375)
UROBILINOGEN UR-MCNC: NORMAL MG/DL
WBC # BLD AUTO: 9.9 10X3/UL (ref 4.8–10.8)
WBC #/AREA URNS HPF: (no result) /HPF (ref 0–5)
YEAST #/AREA URNS HPF: (no result) /HPF

## 2019-01-29 NOTE — NUR
PT PULLED OUT RIGHT FA IV W/ CATHETER TIP INTACT. RESITED 22G IV RIGHT HAND X1
ATTEMPT INFUSING NS @ 125. , GAVE 8 UNITS PER SS. POSEY ON. CL IN REACH

## 2019-01-29 NOTE — MORECARE
CASE MANAGEMENT DISCHARGE SUMMARY
 
 
PATIENT: JASMIN VILLAVICENCIO                       UNIT: [id number]
ACCOUNT#: [id number]                       ADM DATE: 19
AGE: 58     : 60  SEX: F            ROOM/BED: D.2202    
AUTHOR: NEGRITODOC                             PHYSICIAN:                               
 
REFERRING PHYSICIAN: SHERIF CABRERA MD               
DATE OF SERVICE: 19
Discharge Plan
 
 
Patient Name: JASMIN VILLAVICENCIO
Facility: [facility]:[place]
Encounter #: [id number]
Medical Record #: [id number]
: 1960
Planned Disposition: Home
Anticipated Discharge Date: 
 
Discharge Date: 
Expected LOS: 
Initial Reviewer: [id number]
Initial Review Date: 2019
Generated: 19   4:14 pm 
Comments
 
DCP- Discharge Planning
 
Updated by [id number]: Clare Sanderson on 19   2:11 pm CT
Patient Name: JASMIN VILLAVICENCIO                                     
Admission Status: ER   
Accout number: [id number]                              
Admission Date: 2019   
: 1960                                                        
Admission Diagnosis:   
Attending: SHERIF CABRERA                                                
Current LOS:  1   
  
Anticipated DC Date:    
Planned Disposition: Home   
Primary Insurance: MEDICARE A & B   
  
  
Discharge Planning Comments:   
CM met with patient to assess discharge planning needs. Patient stated that 
she lives in [facility] where she lives with her Daughter and plans on 
returning there at discharge. Her daughter will be the one to drive her home. 
She is independent with her care at home. She is not currently with a HH. She 
 
has used [facility] in the past.  She has a cane, hospital bed, walker, 
crutches at home. She feels safe to return to her home. CM will continue to 
follow and assist with DC planning as needed.  
  
  
  
  
  
: Clare Sanderson
 DCPIA - Discharge Planning Initial Assessment
 
Updated by [id number]: Clare Sanderson on 19   3:09 pm
*  Is the patient Alert and Oriented?
Yes
*  How many steps to enter\exit or inside your home?
 
*  PCP
DR PEREZ ([place])
*  Pharmacy
[facility]
*  Preadmission Environment
Home with Family
*  ADLs
Independent
*  Equipment
Cane
Crutch
Hospital Bed
Rolling Walker
*  List name and contact numbers for known caregivers / representatives who 
currently or will assist patient after discharge:
ANDRÉS (DAUGHTER)
*  Verbal permission to speak to the caregivers and representatives has been 
obtained from the patient.
N/A
*  Community resources currently utilized
None
*  Additional services required to return to the preadmission environment?
No
*  Can the patient safely return to the preadmission environment?
Yes
*  Has this patient been hospitalized within the prior 30 days at any 
hospital?
No
 
 
 
 
 
 
Patient Name: JASMIN VILLAVICENCIO
 
Encounter #: [id number]
Page [id number]
 
 
 
 
 
Electronically Signed by BRENNAN MAHAN on 19 at 1514
 
 
 
 
 
 
**All edits/amendments must be made on the electronic document**
 
DICTATION DATE: 19     : KAELA  19     
RPT#: [id number]                                DC DATE:        
                                               STATUS: ADM IN  
[facility]
[address] [place] [address]
***END OF REPORT***

## 2019-01-29 NOTE — NUR
PT ARRIVED TO FLOOR VIA WHEELCHAIR. ALERT AND ORIENTED. NO SIGNS OF DISTRESS.
STATES NO PAIN AT THIS TIME. IV RIGHT FA INFUSING NS @ 125. NO REDNESS OR
SWELLING AT INSERTION SITE, DRESSING CDI. O2 1.5L/NC. BREATHING SLIGHTLY
LABORED. PROVIDED WATER UPON REQUEST. NO OTHER NEEDS OR COMPLAINTS AT THIS
TIME. ENCOURAGED TO CALL W/ ANY NEEDS. BED LOWEST POSITION, SRX2, POSEY ON. CL
IN REACH, WILL CONTINUE TO MONITOR

## 2019-01-29 NOTE — NUR
DR. CABRERA CALLED W/ ORDERS FOR LOVENOX 1MG/KG DAILY. PT ALREADY RECIEVED
40MG TODAY, ORDERS TO GIVE REMAINING AMOUNT NEEDED. PT WEIGHS 94KG. CALLED
PHARMACY AND INFORMED THEM OF CHANGE. PT GIVEN LOVENOX 50MG NOW, NEW ORDER FOR
90MG DAILY PUT IN, DC'D LOVENOX 40MG DAILY.

## 2019-01-29 NOTE — NUR
PT SITTING UP IN BED, NO SIGNS OF DISTRESS. ALERT AND ORIENTED. O2 1.5L/NC. PT
STATES NO PAIN AT THIS TIME. RIGHT BKA DRESSING CDI. POSEY ON. PT UP TO
BEDSIDE COMMODE W/ ASSIST TO VOID. IV RIGHT FA INFUSING NS @ 125. NO NEEDS OR
COMPLAINTS AT THIS TIME. CL IN REACH, WILL CONTINUE TO MONITOR

## 2019-01-29 NOTE — NUR
PT ALERT X 4. SLIGHT EXPIRATORY WHEEZE TO LLL, 1.5L O2 PER NC. IV TO LEFT
FOREARM, PATENT, DRESSING CLEAN DRY AND INTACT. EXISTING RIGHT BKA, PT FELL AT
HOME RECENTLY AND INCISION OPENED, DRESSING CLEAN DRY AND INTACT. NO PAIN THIS
MORNING. BED LOW, CALL LIGHT IN REACH, NO OTHER NEEDS AT THIS TIME.

## 2019-01-30 VITALS — SYSTOLIC BLOOD PRESSURE: 128 MMHG | DIASTOLIC BLOOD PRESSURE: 73 MMHG

## 2019-01-30 VITALS — SYSTOLIC BLOOD PRESSURE: 127 MMHG | DIASTOLIC BLOOD PRESSURE: 54 MMHG

## 2019-01-30 VITALS — SYSTOLIC BLOOD PRESSURE: 85 MMHG | DIASTOLIC BLOOD PRESSURE: 58 MMHG

## 2019-01-30 VITALS — SYSTOLIC BLOOD PRESSURE: 112 MMHG | DIASTOLIC BLOOD PRESSURE: 80 MMHG

## 2019-01-30 VITALS — SYSTOLIC BLOOD PRESSURE: 140 MMHG | DIASTOLIC BLOOD PRESSURE: 69 MMHG

## 2019-01-30 VITALS — SYSTOLIC BLOOD PRESSURE: 122 MMHG | DIASTOLIC BLOOD PRESSURE: 66 MMHG

## 2019-01-30 LAB
ANION GAP SERPL CALC-SCNC: 11.4 MMOL/L (ref 8–16)
BASOPHILS NFR BLD AUTO: 0.4 % (ref 0–2)
BUN SERPL-MCNC: 37 MG/DL (ref 7–18)
CALCIUM SERPL-MCNC: 8.6 MG/DL (ref 8.5–10.1)
CHLORIDE SERPL-SCNC: 104 MMOL/L (ref 98–107)
CO2 SERPL-SCNC: 27.7 MMOL/L (ref 21–32)
CREAT SERPL-MCNC: 1.4 MG/DL (ref 0.6–1.3)
EOSINOPHIL NFR BLD: 2.2 % (ref 0–7)
ERYTHROCYTE [DISTWIDTH] IN BLOOD BY AUTOMATED COUNT: 15.1 % (ref 11.5–14.5)
FOLATE SERPL-MCNC: >20 NG/ML (ref 3–?)
GLUCOSE SERPL-MCNC: 162 MG/DL (ref 74–106)
HCT VFR BLD CALC: 28.6 % (ref 36–48)
HGB BLD-MCNC: 8.6 G/DL (ref 12–16)
IMM GRANULOCYTES NFR BLD: 0.3 % (ref 0–5)
LYMPHOCYTES NFR BLD AUTO: 24.6 % (ref 15–50)
MCH RBC QN AUTO: 28.6 PG (ref 26–34)
MCHC RBC AUTO-ENTMCNC: 30.1 G/DL (ref 31–37)
MCV RBC: 95 FL (ref 80–100)
MONOCYTES NFR BLD: 9.4 % (ref 2–11)
NEUTROPHILS NFR BLD AUTO: 63.1 % (ref 40–80)
OSMOLALITY SERPL CALC.SUM OF ELEC: 288 MOSM/KG (ref 275–300)
PLATELET # BLD: 143 10X3/UL (ref 130–400)
PMV BLD AUTO: 10.3 FL (ref 7.4–10.4)
POTASSIUM SERPL-SCNC: 5.1 MMOL/L (ref 3.5–5.1)
RBC # BLD AUTO: 3.01 10X6/UL (ref 4–5.4)
SODIUM SERPL-SCNC: 138 MMOL/L (ref 136–145)
VIT B12 SERPL-MCNC: 975 PG/ML (ref 232–1245)
WBC # BLD AUTO: 6.9 10X3/UL (ref 4.8–10.8)

## 2019-01-30 NOTE — NUR
PT SOB W/ PERSISTANT COUGH, RED IN THE FACE ON 3L/NC. CALLED RESP FOR PRN
BREATHING TRX. RESP GAVE PRN TRX, PT TOLERATED WELL. BREATHING SLOW AND EVEN.
PT STATES PAIN 8/10. GAVE DILAUDID AS ORDERED. WILL CONTINUE TO MONITOR. POSEY
ON

## 2019-01-30 NOTE — NUR
MORNING ASSESSMENT COMPLETE. SEE ASSESSMENT FLOWSHEET FOR FURTHER DETAILS. PT
LYING IN BED AAO X4 TO PERSON, PLACE, TIME AND SITUATION. PT ON 3L O2 PER NC-
SATS WITHIN NORMAL RANGE ON O2. R LEG BKA DRSG IN PLACE. R HAND NS @125-
C/D/I;PATENT. S1 AND S2 HEARD AT AORTIC, PULMONIOC, ERBS, TRICUSPID, AND
MITRAL SITES- ON TELEMETRY AND RUNNING SINUS RHYTHM. DENIES NEEDS AT THIS
TIME. CL IN REACH. SIDE RAILS UP X3 FOR PATIENT SAFETT

## 2019-01-30 NOTE — NUR
PT STATES SHE WANTS TO GET UP AND TAKE A SHOWER. I TOLD HER THAT WE COULD GIVE
HER A BED BATH- REFUSED THAT. ASKED HER IF I COULD HAVE NURSING STAFF SIT IT
BATHROOM WITH HER WHILE SHE TAKES A SHOWER TO MONITOR SAFETY- REFUSED. REFUSED
BED ALAR; FILLED OUT BED ALARM REFUSAL SHEET.

## 2019-01-31 VITALS — DIASTOLIC BLOOD PRESSURE: 80 MMHG | SYSTOLIC BLOOD PRESSURE: 140 MMHG

## 2019-01-31 VITALS — DIASTOLIC BLOOD PRESSURE: 75 MMHG | SYSTOLIC BLOOD PRESSURE: 141 MMHG

## 2019-01-31 VITALS — SYSTOLIC BLOOD PRESSURE: 121 MMHG | DIASTOLIC BLOOD PRESSURE: 59 MMHG

## 2019-01-31 VITALS — SYSTOLIC BLOOD PRESSURE: 120 MMHG | DIASTOLIC BLOOD PRESSURE: 60 MMHG

## 2019-01-31 VITALS — SYSTOLIC BLOOD PRESSURE: 123 MMHG | DIASTOLIC BLOOD PRESSURE: 55 MMHG

## 2019-01-31 LAB
ANION GAP SERPL CALC-SCNC: 13.9 MMOL/L (ref 8–16)
BASOPHILS NFR BLD AUTO: 0.4 % (ref 0–2)
BUN SERPL-MCNC: 30 MG/DL (ref 7–18)
CALCIUM SERPL-MCNC: 8.5 MG/DL (ref 8.5–10.1)
CHLORIDE SERPL-SCNC: 100 MMOL/L (ref 98–107)
CO2 SERPL-SCNC: 27 MMOL/L (ref 21–32)
CREAT SERPL-MCNC: 1.3 MG/DL (ref 0.6–1.3)
EOSINOPHIL NFR BLD: 2.1 % (ref 0–7)
ERYTHROCYTE [DISTWIDTH] IN BLOOD BY AUTOMATED COUNT: 15.3 % (ref 11.5–14.5)
GLUCOSE SERPL-MCNC: 95 MG/DL (ref 74–106)
HCT VFR BLD CALC: 27.9 % (ref 36–48)
HGB BLD-MCNC: 8.5 G/DL (ref 12–16)
IMM GRANULOCYTES NFR BLD: 0.4 % (ref 0–5)
LYMPHOCYTES NFR BLD AUTO: 26 % (ref 15–50)
MCH RBC QN AUTO: 29 PG (ref 26–34)
MCHC RBC AUTO-ENTMCNC: 30.5 G/DL (ref 31–37)
MCV RBC: 95.2 FL (ref 80–100)
MONOCYTES NFR BLD: 12.5 % (ref 2–11)
NEUTROPHILS NFR BLD AUTO: 58.6 % (ref 40–80)
OSMOLALITY SERPL CALC.SUM OF ELEC: 277 MOSM/KG (ref 275–300)
PLATELET # BLD: 161 10X3/UL (ref 130–400)
PMV BLD AUTO: 10 FL (ref 7.4–10.4)
POTASSIUM SERPL-SCNC: 4.9 MMOL/L (ref 3.5–5.1)
RBC # BLD AUTO: 2.93 10X6/UL (ref 4–5.4)
SODIUM SERPL-SCNC: 136 MMOL/L (ref 136–145)
WBC # BLD AUTO: 7.1 10X3/UL (ref 4.8–10.8)

## 2019-01-31 NOTE — NUR
NUTRITION F/U
CHART REVIEWED, PT VISIT. TOLERATING RENAL ADA  DIET WITH 75% INTAKE
BREAKFAST. WILL CONTINUE TO PROVIDE DIET, MONITOR PO INTAKE.
RD FOLLOWING

## 2019-01-31 NOTE — NUR
PATIENT HAD CALL LIGHT ON AND YELLING SHE STAED THAT SHE WANTED THE OTHER
REMOTE FOR THE TV, STATED THAT SHE DIDN'T WANT TO WAIT ANY LONGER. STATED I
PUT MY SELF TO THE CHAIR AT BEDSIDE AND THEN LOWERED MYSELF TO THE FLOOR .
WHEN ASK IF SHE HIT OR BUMPED ANYTHING SHE STATED NO ASSISMENT DONE NO INJURY
NOTED, PATIENT STATED HE DID NOPT HURT HERSELF OR HITOR BUMP ANYTHING. PATIEN
WAS NOTED TO BED SOB HAD REMOVED O2, O2 RELPACED ENCOUREGED TO DEEP BREATHE
RELAX, ASSISTED TO BED WITH STAFF X3 FOR SAFETY. PATIENT EDUCATED NOT TO SELF
TRANSFER , STAFF WILL COME AND ASSIST BUT AT TIMES WE MUST FINISH WITH
ANOUTHER PATIENT BEFOR WE CAN COM TO HER SO PLEASE BE PATIENT WITH US.  HOUSE
SUPERVISOR CALLED AND INFORMED , CALL TO DR. CABRERA OFFICE  RETRUN CALL FROM
ELLIS HERNANDEZ NEW ORDERS AT THIS TIME.

## 2019-01-31 NOTE — NUR
ALERT AND ORENTED ABLE TO VOICE NEEDS AND WANTS TO STAFF. NS AT
50ML/HR TO MIDLINE TO LEFT ARM. REFUSED SCD, UP WALKING OFTEN
CALL LIGHT IN REACH WHEN IN ROOM

## 2019-01-31 NOTE — NUR
PATIENT IN BED WITH O2 VIA N/C THAT SHE HAS TAKEN OFF MULTABLE TIMES THIS
SHIFT. EDCATED EACH TIME THAT IT IS NESSARY TO KEEP IT ON TO KEEP HER O2 ABOVE
90. THAT WILL HELP WITH THE ANXIETY.  ALARM REPLACED IN BED AS IT HAD BEEN
TAKEN OFF AND PUT IN THE FLOOR. CALL LIGHT IN REACH.

## 2019-01-31 NOTE — NUR
MORNING ASSESSMENT COMPLETE. SEE ASSESSMENT FLOWSHEET FOR FURTHER DETAILS. PT
LYING IN BED AAO X4 TO PERSON, PLACE, TIME AND SITUATION. REPORT PAIN LEVEL OF
8(0-10)- WILL GIVE PAIN WHEN TIME IS DUE. R BKA- DRSG IN PLACE AND NO
DRAINAGE NOTED TO INCISION SITES. BSC AT BEDSIDE. NO FURTHER NEEDS AT THIS
TIME. CL IN REACH. SIDE RAILS UP X3 FOR PATIENT SAFETY.

## 2019-02-01 VITALS — DIASTOLIC BLOOD PRESSURE: 48 MMHG | SYSTOLIC BLOOD PRESSURE: 128 MMHG

## 2019-02-01 VITALS — DIASTOLIC BLOOD PRESSURE: 73 MMHG | SYSTOLIC BLOOD PRESSURE: 151 MMHG

## 2019-02-01 VITALS — DIASTOLIC BLOOD PRESSURE: 58 MMHG | SYSTOLIC BLOOD PRESSURE: 127 MMHG

## 2019-02-01 VITALS — SYSTOLIC BLOOD PRESSURE: 89 MMHG | DIASTOLIC BLOOD PRESSURE: 64 MMHG

## 2019-02-01 VITALS — SYSTOLIC BLOOD PRESSURE: 151 MMHG | DIASTOLIC BLOOD PRESSURE: 72 MMHG

## 2019-02-01 VITALS — DIASTOLIC BLOOD PRESSURE: 69 MMHG | SYSTOLIC BLOOD PRESSURE: 126 MMHG

## 2019-02-01 LAB
ANION GAP SERPL CALC-SCNC: 17.2 MMOL/L (ref 8–16)
BASOPHILS NFR BLD AUTO: 0.5 % (ref 0–2)
BUN SERPL-MCNC: 26 MG/DL (ref 7–18)
CALCIUM SERPL-MCNC: 8.5 MG/DL (ref 8.5–10.1)
CHLORIDE SERPL-SCNC: 102 MMOL/L (ref 98–107)
CO2 SERPL-SCNC: 21.4 MMOL/L (ref 21–32)
CREAT SERPL-MCNC: 1.5 MG/DL (ref 0.6–1.3)
EOSINOPHIL NFR BLD: 1.8 % (ref 0–7)
ERYTHROCYTE [DISTWIDTH] IN BLOOD BY AUTOMATED COUNT: 15.2 % (ref 11.5–14.5)
GLUCOSE SERPL-MCNC: 126 MG/DL (ref 74–106)
HCT VFR BLD CALC: 26.5 % (ref 36–48)
HGB BLD-MCNC: 8 G/DL (ref 12–16)
IMM GRANULOCYTES NFR BLD: 0.7 % (ref 0–5)
LYMPHOCYTES NFR BLD AUTO: 22.7 % (ref 15–50)
MCH RBC QN AUTO: 28.5 PG (ref 26–34)
MCHC RBC AUTO-ENTMCNC: 30.2 G/DL (ref 31–37)
MCV RBC: 94.3 FL (ref 80–100)
MONOCYTES NFR BLD: 14.2 % (ref 2–11)
NEUTROPHILS NFR BLD AUTO: 60.1 % (ref 40–80)
OSMOLALITY SERPL CALC.SUM OF ELEC: 278 MOSM/KG (ref 275–300)
PLATELET # BLD: 142 10X3/UL (ref 130–400)
PMV BLD AUTO: 9.5 FL (ref 7.4–10.4)
POTASSIUM SERPL-SCNC: 4.6 MMOL/L (ref 3.5–5.1)
RBC # BLD AUTO: 2.81 10X6/UL (ref 4–5.4)
SODIUM SERPL-SCNC: 136 MMOL/L (ref 136–145)
WBC # BLD AUTO: 6 10X3/UL (ref 4.8–10.8)

## 2019-02-01 NOTE — NUR
PT RESTING IN BED. ALERT AND ORIENTED. NO SIGNS OF DISTRESS. BREATHING EVEN
AND UNLABORED. PT STATES NO PROBLEMS AT THIS TIME. IV SITE RT FA DRESSING
CLEAN DRY AND INTACT. NO SIGNS OF INFECTION. 2LO2 NASAL CANNULA. BOWEL SOUNDS
ACTIVE. RT BELOW THE KNEE AMPUTAION. SKIN CLEAN DRY AND INTACT. NO LOWER LEG
SWELLING PRESENT. WILL CONTINUE PLAN OF CARE. CALL LIGHT IN REACH.

## 2019-02-01 NOTE — NUR
PT RESTING IN BED, DISPLAYING MILD ANXIETY, VOICING SOB.  02 @ 2L NC IN PLACE,
ENCOURAGED DEEP BREATHS.  PT BEGAN CALMING DOWN, BREATHING BECOME LESS
LABORED.  REPORTS PAIN 6/10, PARTICULARLY WHEN COUGHING.  DISCUSSED TIME OF
NEXT PRN DOSE OF MEDICATION. PT VOICES UNDERSTANDING.  IV TO RIGHT FOREARM
WITH NS @ 100ML/HR INFUSING VIA PUMP.  SITE WITHOUT REDNESS OR EDEMA.  DENIES
FURTHER NEEDS AT THIS TIME.  CL WITHIN REACH.  ENCOURAGED TO CALL WITH NEEDS.
CONTINUE TO MONITOR.

## 2019-02-01 NOTE — NUR
PT LYING IN BREAD STATING "I CANT BREATH VERY WELL"  NASAL CANNULA LYING IN
BED.  REPLACED O2 @ 2L.  EDUCATED REGARDING TAKING SLOW DEEP BREATHS THROUGH
HER NOSE AND OUT THROUGH HER MOUTH. PT BEGAN CALMING DOWN, DISPLAYING LESSENED
ANXIETY.  REPORTS PAIN 9/10 AT THIS TIME.  ULTRAM ADMINISTERED PER MD ORDERS.
DENIES FURTHER NEEDS AT THIS TIME.  CL WITHIN REACH.  WILL CONTINUE TO
MONITOR.

## 2019-02-02 VITALS — SYSTOLIC BLOOD PRESSURE: 134 MMHG | DIASTOLIC BLOOD PRESSURE: 56 MMHG

## 2019-02-02 VITALS — DIASTOLIC BLOOD PRESSURE: 58 MMHG | SYSTOLIC BLOOD PRESSURE: 109 MMHG

## 2019-02-02 VITALS — DIASTOLIC BLOOD PRESSURE: 81 MMHG | SYSTOLIC BLOOD PRESSURE: 163 MMHG

## 2019-02-02 VITALS — DIASTOLIC BLOOD PRESSURE: 61 MMHG | SYSTOLIC BLOOD PRESSURE: 110 MMHG

## 2019-02-02 LAB
ANION GAP SERPL CALC-SCNC: 13.6 MMOL/L (ref 8–16)
BASOPHILS NFR BLD AUTO: 0.2 % (ref 0–2)
BUN SERPL-MCNC: 12 MG/DL (ref 7–18)
CALCIUM SERPL-MCNC: 8.4 MG/DL (ref 8.5–10.1)
CHLORIDE SERPL-SCNC: 105 MMOL/L (ref 98–107)
CO2 SERPL-SCNC: 25.3 MMOL/L (ref 21–32)
CREAT SERPL-MCNC: 1.1 MG/DL (ref 0.6–1.3)
EOSINOPHIL NFR BLD: 1.4 % (ref 0–7)
ERYTHROCYTE [DISTWIDTH] IN BLOOD BY AUTOMATED COUNT: 15.4 % (ref 11.5–14.5)
GLUCOSE SERPL-MCNC: 192 MG/DL (ref 74–106)
HCT VFR BLD CALC: 26.3 % (ref 36–48)
HGB BLD-MCNC: 8.1 G/DL (ref 12–16)
IMM GRANULOCYTES NFR BLD: 0.7 % (ref 0–5)
LYMPHOCYTES NFR BLD AUTO: 19 % (ref 15–50)
MCH RBC QN AUTO: 28.9 PG (ref 26–34)
MCHC RBC AUTO-ENTMCNC: 30.8 G/DL (ref 31–37)
MCV RBC: 93.9 FL (ref 80–100)
MONOCYTES NFR BLD: 12.6 % (ref 2–11)
NEUTROPHILS NFR BLD AUTO: 66.1 % (ref 40–80)
OSMOLALITY SERPL CALC.SUM OF ELEC: 283 MOSM/KG (ref 275–300)
PLATELET # BLD: 144 10X3/UL (ref 130–400)
PMV BLD AUTO: 10 FL (ref 7.4–10.4)
POTASSIUM SERPL-SCNC: 3.9 MMOL/L (ref 3.5–5.1)
RBC # BLD AUTO: 2.8 10X6/UL (ref 4–5.4)
SODIUM SERPL-SCNC: 140 MMOL/L (ref 136–145)
WBC # BLD AUTO: 5.5 10X3/UL (ref 4.8–10.8)

## 2019-02-02 NOTE — NUR
PT SALINE LOCK TO RIGHT FOREARM D/C'D FOR DISCHARGE.  CATH INTACT, DRESSING
PLACED.  DISCHARGE INSTRUCTIONS GIVEN INSTRUCTING FOLLOW UP APPOINTMENTS AND
MEDICATIONS CALLED INTO PHARMACY OF CHOICE.  PT TAKEN OUT VIA W/C TO PRIVATE
VEHICLE WITH ALL PERSONAL POSESSIONS.

## 2019-02-02 NOTE — NUR
PT RESTING IN BED WITH EYES CLOSED.  OPENS EYES WHEN NAME CALLED.  RESP EVEN
AND UNLABORED.  O2 @ 3L NC IN PLACE.  NO ANXIETY NOTED AT THIS TIME.  SALINE
LOC TO RIGHT FOREARM, SITE WITHOUT REDNESS OR EDEMA, EASILY FLUSHES.  DENIES
PAIN AT THIS TIME.  DENIES FURTHER NEEDS AT THIS TIME.  CL WITHIN REACH.
ENCOURAGED TO CALL WITH NEEDS.  PT REFUSES USE OF SCD.  WILL CONTINUE POC

## 2019-02-02 NOTE — NUR
ASSESSED, PT AWAKE AND COMPLAINING OF NOT BEING ABLE TO BREATHE. PANICING,
NURSE AT THE BEDSIDE AND RESP CALLED FOR TREATMENT. PT CALMED DOWN AFTER
STARTING TREATMENT.

## 2019-02-02 NOTE — EC
PATIENT:JASMIN VILLAVICENCIO                   DATE OF SERVICE: 01/28/19
SEX: F                                  MEDICAL RECORD: [id number]
DATE OF BIRTH: [birth year]                        LOCATION:[facility]
AGE OF PATIENT: 58                             ADMISSION DATE: 01/28/19
 
REFERRING PHYSICIAN:                               
 
INTERPRETING PHYSICIAN: BROOKE VILLANUEVA MD          
 
 
 
                             ECHOCARDIOGRAM REPORT
  ECHO CHARGES 4               ECHO COMPLETE                 Date: 01/30/19
 
 
 
CLINICAL DIAGNOSIS: CHF                           
 
                         ECHOCARDIOGRAPHIC MEASUREMENTS
      (adult normal given)
   AC root (d.<3.7cm) 2.9  cm   LV Septum d (<1.2 cm> 1.0  cm
      Valve Excursion 1.3  cm     LV Septum (systole) 1.4  cm
Left Atria (s.<4.0cm> 3.4  cm          LVPW d(<1.2cm) 1.4  cm
        RV (d.<2.3cm)      cm           LVPW (sytole) 1.6  cm
  LV diastole(<5.6CM) 5.4  cm       MV E-F(>70mm/sec)      cm
           LV systole 4.2  cm           LVOT Diameter 1.3  cm
       MV exc.(>10mm)      cm
Est.ejection fraction (50-75%)     %
 
   DOPPLER:
     LVIT      cm/sec A 108  cm/sec E 117   cm/sec
       LA      cm/sec      RVSP 21.7 mmHg
     LVOT 136  cm/sec   AOP1/2T      m/s
  Asc. Ao 203  cm/sec
     RVOT 60   cm/sec
       RA      cm/sec
       PA 87   cm/sec
 AV Gradient Peak 16.6 mmHg  AV Mean 10.4 mmHg  AV Area 0.9  cm
 MV Gradient Peak 9.7  mmHg  MV Mean 6.0  mmHg  MV Area      cm
   COMMENTS:                                              
 
 
 Cardiac Sonographer: Tim CHAVZEICA ZENAIDA             
      Cardiologist: 3          Dr. Stephen             
             TAPE# PACS           
                                       Pericardial Effusion N                        
 
 
DATE OF SERVICE:  
 
Adequate 2-D, color flow and spectral Doppler, and M-mode.
 
Borderline LVH.  LV internal dimension is normal.  Wall motion is normal.  EF is
greater than 55%.  Aortic valve sclerosis without stenosis by Doppler
interrogation.  Left atrium is normal at 3.4 cm.  Mitral valve shows no
prolapse.  Trace MR.  Right-sided chambers are normal.  Moderate TR.
 
TRANSINT:[id number] Voice Confirmation ID: [id number] DOCUMENT ID: [id number]
 
 
 
ECHOCARDIOGRAM REPORT                          [id number]    JASMIN VILLAVICENCIO           
 
 
                                           
                                           BROOKE VILLANUEVA MD          
 
 
 
Electronically Signed by BROOKE VILLANUEVA on 02/02/19 at 1255
 
 
 
 
 
 
 
 
 
 
 
 
 
 
 
 
 
 
 
 
 
 
 
 
 
 
 
 
 
 
 
 
 
 
 
 
 
 
 
 
CC:                                                             [id number]
DICTATION DATE: 01/30/19 1408     :     01/30/19 1845      ADM IN  
                                                                              
[id number] [place] [address]

## 2019-02-04 NOTE — MORECARE
CASE MANAGEMENT DISCHARGE SUMMARY
 
 
PATIENT: JASMIN VILLAVICENCIO                       UNIT: [id number]
ACCOUNT#: [id number]                       ADM DATE: 19
AGE: 58     : 60  SEX: F            ROOM/BED: D.2202    
AUTHOR: NEGRITO,DOC                             PHYSICIAN:                               
 
REFERRING PHYSICIAN: SHERIF CABRERA MD               
DATE OF SERVICE: 19
Discharge Plan
 
 
Patient Name: JASMIN VILLAVICENCIO
Facility: [facility]:[place]
Encounter #: [id number]
Medical Record #: [id number]
: 1960
Planned Disposition: Home
Anticipated Discharge Date: 
 
Discharge Date: 2019
Expected LOS: 0
Initial Reviewer: [id number]
Initial Review Date: 2019
Generated: 19  11:00 am 
Comments
 
DCP- Discharge Planning
 
Updated by [id number]: Clare Sanderson on 19   2:11 pm CT
Patient Name: JASMIN VILLAVICENCIO                                     
Admission Status: ER   
Accout number: [id number]                              
Admission Date: 2019   
: 1960                                                        
Admission Diagnosis:   
Attending: SHERIF CABRERA                                                
Current LOS:  1   
  
Anticipated DC Date:    
Planned Disposition: Home   
Primary Insurance: MEDICARE A & B   
  
  
Discharge Planning Comments:   
CM met with patient to assess discharge planning needs. Patient stated that 
she lives in [facility] where she lives with her Daughter and plans on 
returning there at discharge. Her daughter will be the one to drive her home. 
She is independent with her care at home. She is not currently with a HH. She 
 
has used [facility] in the past.  She has a cane, hospital bed, walker, 
crutches at home. She feels safe to return to her home. CM will continue to 
follow and assist with DC planning as needed.  
  
  
  
  
  
: Clare Sanderson
 DCPIA - Discharge Planning Initial Assessment
 
Updated by [id number]: Clare Sanderson on 19   3:09 pm
*  Is the patient Alert and Oriented?
Yes
*  How many steps to enter\exit or inside your home?
 
*  PCP
DR PEREZ ([place])
*  Pharmacy
[facility]
*  Preadmission Environment
Home with Family
*  ADLs
Independent
*  Equipment
Cane
Crutch
Hospital Bed
Rolling Walker
*  List name and contact numbers for known caregivers / representatives who 
currently or will assist patient after discharge:
ANDRÉS (DAUGHTER)
*  Verbal permission to speak to the caregivers and representatives has been 
obtained from the patient.
N/A
*  Community resources currently utilized
None
*  Additional services required to return to the preadmission environment?
No
*  Can the patient safely return to the preadmission environment?
Yes
*  Has this patient been hospitalized within the prior 30 days at any 
hospital?
No
 
 
 
 
 
 
 
Last DP export: 19   2:14 p
 
Patient Name: JASMIN VILLAVICENCIO
Encounter #: [id number]
Page [phone]
 
 
 
 
 
Electronically Signed by BRENNAN MAHAN on 19 at 1000
 
 
 
 
 
 
**All edits/amendments must be made on the electronic document**
 
DICTATION DATE: 19 1000     : KAELA  19 1000     
RPT#: [id number]                                DC DATE:19
                                               STATUS: DIS IN  
[facility]
 [place] [address]
***END OF REPORT***

## 2021-05-02 ENCOUNTER — HOSPITAL ENCOUNTER (OUTPATIENT)
Dept: HOSPITAL 84 - D.M2 | Age: 61
Setting detail: OBSERVATION
LOS: 2 days | Discharge: HOME | End: 2021-05-04
Attending: FAMILY MEDICINE | Admitting: FAMILY MEDICINE
Payer: MEDICARE

## 2021-05-02 DIAGNOSIS — M19.90: ICD-10-CM

## 2021-05-02 DIAGNOSIS — N18.9: ICD-10-CM

## 2021-05-02 DIAGNOSIS — I25.110: Primary | ICD-10-CM

## 2021-05-02 DIAGNOSIS — E78.5: ICD-10-CM

## 2021-05-02 DIAGNOSIS — G47.33: ICD-10-CM

## 2021-05-02 DIAGNOSIS — E87.6: ICD-10-CM

## 2021-05-02 DIAGNOSIS — E11.22: ICD-10-CM

## 2021-05-02 DIAGNOSIS — Z79.4: ICD-10-CM

## 2021-05-02 DIAGNOSIS — E11.40: ICD-10-CM

## 2021-05-02 DIAGNOSIS — J44.9: ICD-10-CM

## 2021-05-02 NOTE — NUR
RECEIVED FROM MENA [facility]&ZACHARY, MEDS AND HISTORY COMPLETE, HAS INSULIN PUMP,CHECK
OWNS BS, HAS [facility], CAN TRANSFER WITH ASSIST, BS WAS 35, PROVIDED SNACKS,
PLACED ON TELEMTRY, RESITED IV-LAC, WILL CONTINUE PLAN OF CARE

## 2021-05-02 NOTE — HEMODYNAMI
PATIENT:JASMIN VILLAVICENCIO                                    MEDICAL RECORD: [id number]
: 60                                            [facility]     D.2118
ACCT# [id number]                                       ADMISSION DATE: 21
 
 
 Generatedon:5/3/30295:55
Patient name: JASMIN VILLAVICENCIO Patient #: [id number] Visit #: [id number] SSN: [id number]
- :
1960 Date of study: 5/3/2021
Page: Of
Hemodynamic Procedure Report
****************************
Patient Data
Patient Demographics
Procedure consent was obtained
First Name: JASMIN          Gender: Female
Last Name: SAUD           : 1960
Patient #: [id number]       Age: 60 year(s)
Visit #: [id number]       Race: 
SSN: 
Accession #:
[id number]
Additional ID: [id number]
Contact details
Address: [address] [place] [address]     Phone: [phone]
State: [place]
City: [place]
Zip code: [address]
Past Medical History
Allergies
Allergen        Reaction        Date         Comments
Reported
Other allergy                   2017    Aleve, Hydrocodone
Admission
Admission Data
Admission Date: 2021    Admission Time: 21:15
Arrival Date: 5/3/2021      Arrival Time: 0:00
Admit Source: Other         Insurance Payor: Medicare
Room #: D.2118              [facility] #: 
Height (in.): 65
Height (cm.): 165.1
Procedure
Procedure Types
Cath Procedure
Diagnostic Procedure
C
[facility] w/Coronaries
FFR/IVUS
FFR Initial
Sedation Charges
Moderate Sedation 25-39 minutes
PCI Procedure
Hemochron ACT Test
PTCA
PTCA Initial
Procedure Description
Procedure Date
Procedure Date: 5/3/2021
Procedure Start Time: 9:18
 
Procedure End Time: 9:47
Procedure Staff
Name                            Function
Earnest Gilmore MD              Performing Physician
Suzan Mcarthur RT               Monitor
Esteban Conde RN                   Nurse
Sneha Leo RT             Scrub
Frances Oden RN                  Nurse
Procedure Data
Cath Procedure
Fluoroscopy
Diagnostic fluoroscopy      Total fluoroscopy Time: 8.5
time: 8.5 min               min
Diagnostic fluoroscopy      Total fluoroscopy dose: 987
dose: 987 mGy               mGy
Contrast Material
Contrast Material Type                       Amount (ml)
Isovue 300                                   108
Entry Location
Entry     Primary  Successful  Side  Size  Upsize Upsize Entry    Closure Succes
sful  Closure
Location                             (Fr)  1 (Fr) 2 (Fr) Remarks  Device        
      Remarks
Femoral                        Right 5 Fr  6 Fr
artery                                     Short
Estimated blood loss: 10 ml
Diagnostic catheters
Device Type               Used For           End Catheter
Placement
MULTIPACK JL 4.0 5Fr      Procedure
catheter
MULTIPACK 3DRC 5Fr        Procedure
catheter
MULTIPACK Pigtail 5 Fr    Ventriculography
catheter
Procedure Complications
No complications
Procedure Medications
Medication           Administration Route Dosage
Oxygen               etCO2 Nasal cannula  2 l/min
Lidocaine 2%         added to field       20
Heparin Flush Bag    added to field       2 bags
(1000units/500ml NS)
0.9% NaCl            I.V.                 100 ml/hr
Versed               I.V.                 0.5 mg
Heparin Bolus        I.V.                 5000 units
Integrilin (Bolus    I.V.                 9 ml
2mg/ml)
Versed               I.V.                 0.5 mg
Fentanyl             I.V.                 50 mcg
0.9% NaCl            I.V. bolus           250 ml
Plavix               P.O.                 600 mg
Hemodynamics
Rest
Heart Rate: 91 (bpm)
Pressure Samples
Time     Site     Value (mmHg) Purpose      Heart      Use
Rate(bpm)
9:22     LV       101/24,26    Snapshot     91
Gradients
 
Valve  Time  Site Site Mean    SEP/DFP    Peak To Heart  Use
1    2    (mmHg)  (sec/min)  Peak    Rate
(mmHg)  (bpm)
Aortic 9:22  LV   AO                              93
Snapshots
Pre Cath      Intra         NCS           Post Cath
Vital Signs
Time    Heart  Resp   SPO2 etCO2   NIBP (mmHg) Rhythm  Pain    Sedation
Rate   (ipm)  (%)  (mmHg)                      Status  Level
(bpm)
9:13:40 94     10     98   0       97/85(93)   NSR     0 (11)  9(A)
, No
pain
9:18:39 92     10     96   0       105/74(82)  NSR     0 (11)  9(A)
, No
pain
9:22:43 95     11     93   0       96/74(89)   NSR     0 (11)  9(A)
, No
pain
9:26:47 93     10     91   0       105/92(104) NSR     0 (11)  9(A)
, No
pain
9:30:56 92     10     97   0       81/71(78)   NSR     0 (11)  9(A)
, No
pain
9:34:54 98     11     98   0       91/79(87)   NSR     0 (11)  9(A)
, No
pain
9:44:58 96     11     99   0       104/84(102) NSR     0 (11)  10(A)
, No
pain
Medications
Time    Medication       Route   Dose  Verified Delivered Reason          Notes 
   Effectiveness
by       by
9:05:08 Oxygen           etCO2   2     Earnest  Esteban      used for
Nasal   l/min St Juanito Conde RN  procedure
cannula       MD
9:05:15 Lidocaine 2%     added   20ml  Earnest  Earnest   for local
to      vial  [facility]   anesthetic
field         MD       MD
9:05:21 Heparin Flush    added   2     Earnest  Earnest   used for
Bag              to      bags  [facility]   procedure
(1000units/500ml field         MD       MD
NS)
9:05:31 0.9% NaCl        I.V.    100   Earnest  Esteban      Per physician
ml/hr [facility] Juanito Conde RN, MD
9:09:30 Versed           I.V.    0.5   Earnest  Esteban      for sedation
mg    [facility] Juan[facility] Conde RN, MD
9:26:23 Heparin Bolus    I.V.    5000  Earnest  Esteban      for             verifi
ed
units St Juanito Conde RN  anticoagulation per
MD Frances RN
9:27:07 Versed           I.V.    0.5   Earnest  Esteban      for sedation
mg    St Juanito Conde RN, MD
9:27:14 Fentanyl         I.V.    50    Earnest  Esteban      for sedation
 
mcg   St Juanito Conde RN, MD
9:29:19 Integrilin       I.V.    9 ml  Earnest  Esteban      for             wasted
 1
(Bolus 2mg/ml)                 St Juanito Conde RN  antiplatelet    ml of
MD                 therapy         vial
9:31:21 0.9% NaCl        I.V.    250   Earnest  Esteban      Per physician
bolus   ml    [facility] Juanito Conde RN, MD
9:42:59 Plavix           P.O.    600   Earnest  Esteban      for
mg    St Juanito Conde RN  antiplatelet
MD                 therapy
Procedure Log
Time     Note
8:51:19  Arrival Date: 5/3/2021 12:00:00 AM
8:51:28  Admit Source: Other
8:53:05  Insurance Payor : Medicare
8:53:22  Patient Height : 65 inches
8:57:16  Procedure Status Urgent Heart Cath (IP).
8:57:21  Sneha Counts RT(R) sent for patient. Start room
use.
8:57:22  Time tracking: Regular hours (M-F 7:00 - 5:00)
8:57:28  Plan of Care:Hemodynamics will remain stable., Cardiac
rhythm will remain stable., Comfort level will be
maintained., Respiratory function will remain
adequate., Patient/ family verbilizes understanding of
procedure., Procedure tolerated without complication.,
Recovers from procedure without complications..
8:57:38  Patient received from Pre/Post Procedure Room to CCL 1
Alert and oriented. Tansferred to table in Supine
position.
8:57:42  Signed procedure consent form obtained from patient.
8:57:44  Warm blankets applied, and lili hugger turned on for
patient comfort.
8:57:44  Correct patient and procedure confirmed by team.
8:57:45  ECG and BP/O2 sat monitors applied to patient.
8:57:54  Snore? Yes
8:58:04  H&P Date Dictated: 2021 Within 30 days and on
chart., H&P Addendum completed by physician on day of
procedure. (MUST COMPLETE FOR ALL OUTPATIENTS).
8:58:19  Pre-procedure instructions explained to patient.
8:58:23  Family unavailable.
8:58:34  Is the patient allergic to Iodine/contrast media? No.
8:58:35  Was the patient premedicated? Yes
8:58:37  Is patient on blood thinner?Yes
8:58:41  **ACC** The patient was administered the following
blood thiners within the last 24 hours: **ACC**Lovenox
8:58:51  Patient diabetic? Yes.
8:58:54  If diabetic: On Metformin? No
8:59:03  Airway obstruction? Yes COPD
8:59:14  Patient pain scale 0/10 ?.
8:59:31  IV patent on arrival in left forearm with 0.9% NaCl at
KVO.
8:59:35  Lab results completed and on chart.
8:59:41  Right groin area was prepped with chlora-prep and
draped in sterile fashion
8:59:42  Alarms reviewed by R. N.
8:59:43  Sharps counted by scrub and verified by R.N.
9:01:50  Physician arrived
9:05:08  Oxygen 2 l/min etCO2 Nasal cannula was administered by
 
Esteban Conde RN; used for procedure; Verbal order read
back and verified.
9:05:15  Lidocaine 2% 20ml vial added to field was administered
by Earnest Gilmore MD; for local anesthetic; Verbal
order read back and verified.
9:05:21  Heparin Flush Bag (1000units/500ml NS) 2 bags added to
field was administered by Earnest Gilmore MD; used for
procedure; Verbal order read back and verified.
9:05:31  0.9% NaCl 100 ml/hr I.V. was administered by Esteban Conde RN; Per physician; Verbal order read back and
verified.
9:05:33  Vital chart was started
9:07:57  --------ALL STOP TIME OUT------
9:08:01  Final Timeout: patient, procedure, and site verified
with staff and physician. All members of the team are
in agreement.
9:08:04  Right groin site verified by team.
9:08:08  Fire Safety Assessment: A--An alcohol-based skin
anteseptic being used preoperatively., C--Open oxygen
or nitrous oxide is being used., D--An ESU, laser, or
fiber-optic light is being used.
9:08:15  Physical assessment completed. ASA score P 3 - A
patient with severe systemic disease as per Earnest Gilmore MD.
9:09:30  Versed 0.5 mg I.V. was administered by Esteban Conde RN;
for sedation; Verbal order read back and verified.
9:09:39  Pt states weight 220 lb
9:18:16  Sedation plan: IV Moderate Sedation Medication:Versed,
Fentanyl
9:18:23  Procedure started.
9:18:23  Full Disclosure recording started
9:18:28  Local anesthetic to right femoral artery with
Lidocaine 2% by Earnest Gilmore MD.**INITIAL ACCESS
ONLY**
9:18:47  A 5 Fr sheath was inserted into the Right Femoral
artery
9:18:56  Use device set Femoral Dx
9:19:15  ACIST Syringe (58502) opened to sterile field.
9:19:15  Bag Decanter (2002S) opened to sterile field.
9:19:16  Medline Cath Pack ([id number]) opened to sterile field.
9:19:17  ACIST Hand Control (28357) opened to sterile field.
9:19:18  ACIST Manifold (33730) opened to sterile field.
9:19:18  DIAGNOSTIC Multipack 5Fr catheter set (LA2044) opened
to sterile field.
9:19:23  SHEATH 5FR [place] (PWT748) opened to sterile field.
9:19:24  EMERALD Guide Wire (853-645) opened to sterile field.
9:19:37  A MULTIPACK JL 4.0 5Fr catheter was advanced over the
wire and used for Procedure.
9::42  LCA angiography performed.
9::56  Catheter removed.
9:22:04  A MULTIPACK 3DRC 5Fr catheter was advanced over the
wire and used for Procedure.
9:22:08  RCA angiography performed.
9:22:20  Catheter removed.
9::27  A MULTIPACK Pigtail 5 Fr catheter was advanced over
the wire and used for Ventriculography.
9:22:35  LV angiography performed.
9:22:39  LV gram done using RAMIRES
9:22:52  EF : 55 %
9::57  Catheter removed.
 
9:25:19  Sheath upsized to a 6 Fr Short.
9:25:40  6 Fr JL4 guide catheter was inserted over the wire
9:26:23  Heparin Bolus 5000 units I.V. was administered by Esteban Conde RN; for anticoagulation; verified per MARLYS Daniels
Verbal order read back and verified.
9:26:55  SHEATH 6FR [place] (YBO351) opened to sterile field.
9:26:56  INFLATOR Merit BasixCompak (IH2436) opened to sterile
field.
9:26:58  GUIDE 6FR JL 4.0 catheter ([id number]) opened to sterile
field.
9:26:59  Volcano OmniWire (29569) opened to sterile field.
9:27:07  Versed 0.5 mg I.V. was administered by Esteban Conde RN;
for sedation; Verbal order read back and verified.
9:27:14  Fentanyl 50 mcg I.V. was administered by Esteban Conde
RN; for sedation; Verbal order read back and verified.
9:29:08  omni wire advanced.
9:29:19  Integrilin (Bolus 2mg/ml) 9 ml I.V. was administered
by Esteban Conde RN; for antiplatelet therapy; wasted 1
ml of vial Verbal order read back and verified.
9:31:21  0.9% NaCl 250 ml I.V. bolus was administered by Esteban Conde RN; Per physician; Verbal order read back and
verified.
9:37:14  Inflate balloon Inflation number: 1 A EUPHORA 3.0 x 15
Balloon ([id number]) was prepped and advanced across the
Prox CX , then inflated to 10 FERN for 0:11 (min:sec) .
9:38:00  Inflation number: 1 The EUPHORA 3.0 x 15 Balloon
([id number]) was reinflated across the Mid CX , to 10
FERN for 0:08 (min:sec) .
9:39:47  Tegaderm 4 x 4 (1626W) opened to sterile field.
9:39:50  EXOSEAL 6Fr () opened to sterile field.
9:40:10  mCirc lesion measured at .96 with IFR
9:40:16  Wire removed.
9:40:18  Guide Catheter removed.
9:40:42  Procedure ended.(Physican Out)
9:41:55  Fluoroscopy time 08.50 minutes.
9:42:59  Plavix 600 mg P.O. was administered by Esteban Conde RN;
for antiplatelet therapy; Verbal order read back and
verified.
9:43:09  Fluoroscopy dose: 987 mGy
9:43:09  Flurop Dose total: 987
9:43:17  Dose Area Product 89968 mGy/cm.
9:43:26  Contrast amount:Isovue 300 108ml.
9:43:29  Sharps counted by scrub and verified by R.N.
9:43:37  Insertion/operative site no bleeding no hematoma.
9:43:49  Estimated blood loss: 10 ml
9:43:52  Post procedure instruction explained to
patient.Patient verbalizes understanding.
9:44:18  ACT drawn and resulted at 233 seconds. (normal
therapeutic range 180-240 seconds).
9:44:23  Procedure type changed to Cath procedure, Diagnostic
procedure, C, [facility] w/Coronaries, FFR/IVUS, FFR
Initial, Sedation Charges, Moderate Sedation 25-39
minutes, PCI procedure, Hemochron ACT Test, PTCA, PTCA
Initial
9:45:12  Procedure and supply charges have been captured,
reviewed, submitted and are correct.
9:47:11  Procedure Complication : No complications
9:47:15  Vital chart was stopped
9:47:19  [facility] Findings: MVD- PCI performed (see procedure note)
9:47:21  Operative report dictated upon procedure completion.
 
9:47:22  See physician's report for complete and final results.
9:47:30  Report given to [facility].
9:47:34  Patient transfered to [facility] with Bed.
9:47:36  Procedure ended.
9:47:36  Full Disclosure recording stopped
9:47:40  End room use (Document Last)
9:48:12  **ACC-PCI Only** Patient was given prescriptions, or
instructed by Earnest Gilmore MD to start/continue the
following medications upon discharge: Plavix
Intervention Summary
Intervention Notes
Time    ActionType  Lesion and  Equipment  Action#  Pressure  Duration
Attributes  Used
9:37:14 Inflate     Prox CX     EUPHORA    1        10        00:12
balloon                 3.0 x 15
Balloon
([id number])
9:38:00 Reinflate   Mid CX      EUPHORA    1        10        00:08
balloon                 3.0 x 15
Balloon
([id number])
Device Usage
Item Name   Manufacture  Quantity  Catalog    Hospital Part    Current Minimal L
ot# /
Number     Charge   Number  Stock   Stock   Serial#
Code
ACIST       Acist        1         80871      017726   205030  222642  20
Syringe     Medical
(12263)     Systems Inc
Bag         Microtek     1               721704   94071   880184  5
Decanter    Medical Inc.
()
Medline     Medline      1         [id number]  269337   52649   848972  5
Cath Pack
([id number])
ACIST Hand  Acist        1         41829      460099   175872  842548  5
Control     Medical
(07970)     Systems Inc
ACIST       Acist        1         99484      606078   535018  786146  5
Manifold    Medical
(51222)     Systems Inc
DIAGNOSTIC  Cardinal     1         GI5533     100825   96815   017920  30
Multipack   Health
5Fr
catheter
set
(KB8922)
SHEATH 5FR  Terumo       1         CBS362     725870   185902  042882  5
[place]onia
(LMS013)
EMERALD     Cardinal     1         502-455    329488   586665  337668  5
Guide Wire  Health
(502-455)
MULTIPACK   Cardinal     1                                     008770  5
JL 4.0 5Fr  Health
catheter
MULTIPACK   Cardinal     1                                     186304  5
3DRC 5Fr    Health
catheter
MULTIPACK   Cardinal     1                                     848102  5
 
Pigtail 5   Health
Fr catheter
SHEATH 6FR  Terumo       1         PFC339     633515   041003  053567  40
[place]onia
(CBU027)
INFLATOR    Merit        1         DU7913     315164   616002  607716  15
Adlyfe       Medical
BasixCompak
(ET7356)
GUIDE 6FR   Medtronic    1         ZY1LD31    537993   29274   680441  1
JL 4.0
catheter
(FI1JG12)
Volcano     [place]      1         8971543    753449   74704   9953    5
OmniWire
(55215)
EUPHORA 3.0 Medtronic    1         ROM4788A   760943   460650  084934  5       2
[id number]
x 15
Balloon
(UDY4981U)
Tegaderm 4  3M           1         1626W      370879   286181  556243  5
x 4 (1626W)
EXOSEAL 6Fr Cardinal     1               299774   898142  080818  10
()     Health
Signature Audit Otis
Stage           Time        Signature      Unsigned
Intra-Procedure 5/3/2021    Suzan Mcarthur
9:53:41 AM  RT(R)
Intra-Procedure 5/3/2021    Frances Oden RN
9:55:10 AM
Intra-Procedure 5/3/2021    Earenst Garcia
9:55:45 AM  Juanito FRANCIS
 
 
 
 
 
 
 
 
 
 
 
 
 
 
 
 
 
 
 
 
 
 
[address] [place] [address]

## 2021-05-03 VITALS — SYSTOLIC BLOOD PRESSURE: 116 MMHG | DIASTOLIC BLOOD PRESSURE: 89 MMHG

## 2021-05-03 VITALS — DIASTOLIC BLOOD PRESSURE: 73 MMHG | SYSTOLIC BLOOD PRESSURE: 127 MMHG

## 2021-05-03 VITALS — DIASTOLIC BLOOD PRESSURE: 77 MMHG | SYSTOLIC BLOOD PRESSURE: 115 MMHG

## 2021-05-03 VITALS — SYSTOLIC BLOOD PRESSURE: 138 MMHG | DIASTOLIC BLOOD PRESSURE: 72 MMHG

## 2021-05-03 VITALS — SYSTOLIC BLOOD PRESSURE: 93 MMHG | DIASTOLIC BLOOD PRESSURE: 62 MMHG

## 2021-05-03 VITALS — SYSTOLIC BLOOD PRESSURE: 95 MMHG | DIASTOLIC BLOOD PRESSURE: 56 MMHG

## 2021-05-03 VITALS — DIASTOLIC BLOOD PRESSURE: 72 MMHG | SYSTOLIC BLOOD PRESSURE: 154 MMHG

## 2021-05-03 VITALS — SYSTOLIC BLOOD PRESSURE: 111 MMHG | DIASTOLIC BLOOD PRESSURE: 53 MMHG

## 2021-05-03 VITALS — DIASTOLIC BLOOD PRESSURE: 56 MMHG | SYSTOLIC BLOOD PRESSURE: 95 MMHG

## 2021-05-03 LAB
ALT SERPL-CCNC: 27 U/L (ref 10–68)
ANION GAP SERPL CALC-SCNC: 15.5 MMOL/L (ref 8–16)
APTT BLD: 33.9 SECONDS (ref 22.8–39.4)
BASOPHILS NFR BLD AUTO: 0.5 % (ref 0–2)
BUN SERPL-MCNC: 31 MG/DL (ref 7–18)
CALCIUM SERPL-MCNC: 9.4 MG/DL (ref 8.5–10.1)
CHLORIDE SERPL-SCNC: 102 MMOL/L (ref 98–107)
CHOLEST/HDLC SERPL: 4.4 RATIO (ref 2.3–4.1)
CHOLEST/HDLC SERPL: 4.5 RATIO (ref 2.3–4.1)
CK MB SERPL-MCNC: 4.5 U/L (ref 0–3.6)
CK MB SERPL-MCNC: 5.2 U/L (ref 0–3.6)
CK MB SERPL-MCNC: 5.8 U/L (ref 0–3.6)
CK SERPL-CCNC: 128 UL (ref 21–215)
CK SERPL-CCNC: 134 UL (ref 21–215)
CK SERPL-CCNC: 161 UL (ref 21–215)
CO2 SERPL-SCNC: 28.3 MMOL/L (ref 21–32)
CREAT SERPL-MCNC: 3 MG/DL (ref 0.6–1.3)
EOSINOPHIL NFR BLD: 3.1 % (ref 0–7)
ERYTHROCYTE [DISTWIDTH] IN BLOOD BY AUTOMATED COUNT: 14.8 % (ref 11.5–14.5)
EST. AVERAGE GLUCOSE BLD GHB EST-MCNC: 240 MG/DL (ref 74–154)
GLUCOSE SERPL-MCNC: 115 MG/DL (ref 74–106)
HCT VFR BLD CALC: 42 % (ref 36–48)
HDLC SERPL-MCNC: 42 MG/DL (ref 32–96)
HDLC SERPL-MCNC: 43 MG/DL (ref 32–96)
HGB BLD-MCNC: 13 G/DL (ref 12–16)
IMM GRANULOCYTES NFR BLD: 0 % (ref 0–5)
INR PPP: 1.24 (ref 0.85–1.17)
LDL-HDL RATIO: 2.9 RATIO (ref 1.5–3.5)
LDL-HDL RATIO: 2.9 RATIO (ref 1.5–3.5)
LDLC SERPL-MCNC: 120 MG/DL (ref 0–100)
LDLC SERPL-MCNC: 123 MG/DL (ref 0–100)
LYMPHOCYTES # BLD: 2.09 10X3/UL (ref 1.18–3.74)
LYMPHOCYTES NFR BLD AUTO: 27.9 % (ref 15–50)
MAGNESIUM SERPL-MCNC: 2.4 MG/DL (ref 1.8–2.4)
MCH RBC QN AUTO: 29 PG (ref 26–34)
MCHC RBC AUTO-ENTMCNC: 31 G/DL (ref 31–37)
MCV RBC: 93.5 FL (ref 80–100)
MONOCYTES NFR BLD: 8 % (ref 2–11)
NEUTROPHILS # BLD: 4.54 10X3/UL (ref 1.56–6.13)
NEUTROPHILS NFR BLD AUTO: 60.5 % (ref 40–80)
NT-PROBNP SERPL-MCNC: 275 PG/ML (ref 0–125)
OSMOLALITY SERPL CALC.SUM OF ELEC: 290 MOSM/KG (ref 275–300)
PHOSPHATE SERPL-MCNC: 5.4 MG/DL (ref 2.5–4.9)
PLATELET # BLD: 188 10X3/UL (ref 130–400)
PMV BLD AUTO: 11.8 FL (ref 7.4–10.4)
POTASSIUM SERPL-SCNC: 3.8 MMOL/L (ref 3.5–5.1)
PROTHROMBIN TIME: 14.5 SECONDS (ref 11.6–15)
RBC # BLD AUTO: 4.49 10X6/UL (ref 4–5.4)
SODIUM SERPL-SCNC: 142 MMOL/L (ref 136–145)
TRIGL SERPL-MCNC: 129 MG/DL (ref 30–200)
TRIGL SERPL-MCNC: 130 MG/DL (ref 30–200)
TROPONIN I SERPL-MCNC: 0.42 NG/ML (ref 0–0.06)
TROPONIN I SERPL-MCNC: 0.58 NG/ML (ref 0–0.06)
TROPONIN I SERPL-MCNC: 0.63 NG/ML (ref 0–0.06)
WBC # BLD AUTO: 7.5 10X3/UL (ref 4.8–10.8)

## 2021-05-03 NOTE — NUR
RECEIVED PT BACK TO ROOM 2118, PT STILL DROWSY BUT EASILY AROUSES TO VOICE.
VITAL SIGNS STABLE, PLACED PT ON FREQUENT VITAL SIGNS. RT GROIN WITH FEMSTOP
IN PLACE NO S/S OF BLEEDING OR HEMATOMA NOTED. INSTRUCTED PT TO LAY FLAT FOR
4HRS. PT DENIES ANY NEEDS, CALL LGIHT IN REACH, WILL CONTINUE PLAN OF CARE.

## 2021-05-03 NOTE — NUR
PT STILL DROWSY WILL EASILY AROUSES TO VOICE. ALL NEEDS MET, NO CHANGES TO RT
GROIN. CALL LIGHT IN REACH. CALLED AND INFORMED SUE PT'S DAUGHTER THAT PT
IS BEING DISHCARGE. DAUGHTER WILL FIND SOMEONE TO COME PICK PT UP.

## 2021-05-03 NOTE — NUR
INITIAL ROUNDS- PT IN BED, C/O PAIN, INFORMED PT THAT SHE CANNOT HAVE ANY PAIN
MEDICATION UNTIL 0820. PT A/O X4, RESP EVEN AND NONLABORED ON 2L NC. LT AC IV
SL AND WITH SWAB CAP ON. PT NPO FOR CARDIOLOGY CONSULT. PT DENIES ANY OTHER
NEEDS AT THIS TIME. CALL LIGHT IN REACH, WILL CONTINUE PLAN OF CARE.

## 2021-05-03 NOTE — NUR
RECEIVED UP IN BED WITH EYES CLOSED. EASILY AROUSES WITH VERBAL STIMULI. IV TO
LT AC SL. HAS A RT BKA. PT HAS HER OWN INSULINB PUMP AND SUPPLIES. UPPER
DENTURES IN DENTURE CUP. SAID SHE BROKE HER BOTTOM ONE A WHILE BACK. DENIES
ANY NEEDS AT THIS TIME.

## 2021-05-03 NOTE — NUR
4MG OF MORPHINE GIVEN FOR PAIN LEVEL O F 8/10. PT DENIES ANY OTHER NEEDS AT
THIS TIME. CALL LIGHT IN REACH.

## 2021-05-03 NOTE — NUR
PT SET UP IN BED, INSTRUCTED HER TO NOT STRAIN OR PUT A LOT OF PRESSURE TO RT
SIDE BECAUSE OF THE RISKS OF BLEEDING FROM HER RIGHT GROIN. PT STILL NOT SURE
IF FAMILY WILL BE ABLE TO PICK HER UP TODAY. CALLED PT'S DAUGHTER SALLY AND
IT WENT STRAIGHT TO VOICEMAIL, UNABLE TO LEAVE A MSG.

## 2021-05-04 VITALS — DIASTOLIC BLOOD PRESSURE: 46 MMHG | SYSTOLIC BLOOD PRESSURE: 109 MMHG

## 2021-05-04 VITALS — DIASTOLIC BLOOD PRESSURE: 55 MMHG | SYSTOLIC BLOOD PRESSURE: 115 MMHG

## 2021-05-04 VITALS — SYSTOLIC BLOOD PRESSURE: 116 MMHG | DIASTOLIC BLOOD PRESSURE: 60 MMHG

## 2021-05-04 LAB
ANION GAP SERPL CALC-SCNC: 16 MMOL/L (ref 8–16)
BASOPHILS NFR BLD AUTO: 0.5 % (ref 0–2)
BUN SERPL-MCNC: 41 MG/DL (ref 7–18)
CALCIUM SERPL-MCNC: 8.5 MG/DL (ref 8.5–10.1)
CHLORIDE SERPL-SCNC: 97 MMOL/L (ref 98–107)
CO2 SERPL-SCNC: 27.7 MMOL/L (ref 21–32)
CREAT SERPL-MCNC: 4 MG/DL (ref 0.6–1.3)
EOSINOPHIL NFR BLD: 2 % (ref 0–7)
ERYTHROCYTE [DISTWIDTH] IN BLOOD BY AUTOMATED COUNT: 14.8 % (ref 11.5–14.5)
GLUCOSE SERPL-MCNC: 417 MG/DL (ref 74–106)
HCT VFR BLD CALC: 35.1 % (ref 36–48)
HGB BLD-MCNC: 11 G/DL (ref 12–16)
IMM GRANULOCYTES NFR BLD: 0.1 % (ref 0–5)
LYMPHOCYTES # BLD: 1.41 10X3/UL (ref 1.18–3.74)
LYMPHOCYTES NFR BLD AUTO: 19 % (ref 15–50)
MAGNESIUM SERPL-MCNC: 2.2 MG/DL (ref 1.8–2.4)
MCH RBC QN AUTO: 28.9 PG (ref 26–34)
MCHC RBC AUTO-ENTMCNC: 31.3 G/DL (ref 31–37)
MCV RBC: 92.1 FL (ref 80–100)
MONOCYTES NFR BLD: 8.2 % (ref 2–11)
NEUTROPHILS # BLD: 5.21 10X3/UL (ref 1.56–6.13)
NEUTROPHILS NFR BLD AUTO: 70.2 % (ref 40–80)
OSMOLALITY SERPL CALC.SUM OF ELEC: 299 MOSM/KG (ref 275–300)
PHOSPHATE SERPL-MCNC: 5.1 MG/DL (ref 2.5–4.9)
PLATELET # BLD: 132 10X3/UL (ref 130–400)
PMV BLD AUTO: 11.1 FL (ref 7.4–10.4)
POTASSIUM SERPL-SCNC: 4.7 MMOL/L (ref 3.5–5.1)
RBC # BLD AUTO: 3.81 10X6/UL (ref 4–5.4)
SODIUM SERPL-SCNC: 136 MMOL/L (ref 136–145)
WBC # BLD AUTO: 7.4 10X3/UL (ref 4.8–10.8)

## 2021-05-04 NOTE — NUR
X 2 ASSIST TO BSC. MAX ASSIST. PATIENT WHILE DOING THIS PULLER HER INSULIN
PUMP OUT OF HER ABDOMEN. STATES, "JUST PACK IT UP"

## 2021-05-04 NOTE — OP
PATIENT NAME:  JASMIN VILLAVICENCIO                             MEDICAL RECORD: [id number]
:60                                             LOCATION:D.M2     D.2118
                                                         ADMISSION DATE:21
SURGEON:  BROOKE VILLANUEVA MD          
 
 
DATE OF OPERATION:  2021
 
PROCEDURE:  Left heart catheterization, selective coronary angiography plus IFR
wire to circ plus PTCA to circ, right femoral artery approach.
 
CATHETERS:  A 5-Northern Irish sheath, 5/4 left and right Shreyas, 5/4 pig.
 
The procedure was well tolerated.  The patient was returned to the machado.  Sheath
removed.  ExoSeal device was placed.
 
FINDINGS:  Left ventriculography in 30-degree RAMIRES view:  Normal wall motion,
normal systolic function.
 
CORONARY ANATOMY:
Left main:  Left main is free of disease.
LAD:  Previous stenting is widely patent.
Circumflex:  2 areas of stenting obviously culprit with a 90% stenosed proximal
and ostial circumflex stent and 80% stenosed distal stent.
Right coronary artery:  Previous stenting area is widely patent.
 
PLAN:  Intervention to circ momentarily.
 
DESCRIPTION:  A 5-Northern Irish sheath was exchanged for a 6-Northern Irish sheath across both
lesions in the circumflex with a IFR wire.  Next, a 3.0 x 50 mm balloon was
taken to 10 atmospheres, both proximally and distally for approximately 45
seconds each inflation.  Final angiography shows excellent resolution of 90%
proximal stenosis as well as 80% distal stenosis confirmed with IFR wire of
0.98.  Sheath was closed with ExoSeal device.  Plavix was loaded in the lab.
 
TRANSINT:[id number] Voice Confirmation ID: [id number] DOCUMENT ID: [id number]
                                           
                                           BROOKE VILLANUEVA MD          
 
 
 
Electronically Signed by BROOKE VILLANUEVA on 21 at 1410
 
 
 
 
 
 
 
 
CC:                                                             [id number]
DICTATION DATE: 21 0945     :     21 1320      ADM IN  
                                                                              
[facility]                                          
[address] [id number]

## 2021-05-04 NOTE — NUR
RE-CHECKED BLOOD SUGAR. 465. I DON'T THINK PATIENT GAVE HERSELF INSULIN. I HAD
HER WALK ME THROUGH THE PUMP ADMINISTRATION AND GAVE HER 15.2 U.

## 2021-05-04 NOTE — NUR
LAB CALLED WITH CRITICAL HIGH BLOOD SUGAR. PT MANAGES HER OWN BLOOD SUGARS.
HAS AN INSULIN PUMP AND CHECKS HER OWN BLOOD SUGARS. HAS ALREADY CHECKED IT
THIS AM.  AND TREATED IT.

## 2021-05-04 NOTE — NUR
VERBAL AND WRITTEN DISCHARGE INSTRUCTIONS GIVEN TO PATIENT AND FAMILY MEMBER
HERE FOR PICKUP. INSULIN PUMP IS IN BELONGINGS BAG ALONG WITH CELL PHONE AND
THIS IS SHOWN TO THE MALE VISITOR FOR PICKUP. THERAPY X 2 TO ASSIST PATIENT TO
WHEELCHAIR AND TO CAR. SALINE LOCK REMMOVED WITH TIP INTACT. HEART MONITOR
TURNED IN.

## 2021-05-04 NOTE — NUR
AM ROUNDING DONE WITH PATIENT HAVING VOICED NO COMPLAINTS AT PRESENT. RIGHT
GROIN FROM PROCEDURE SEEN YESTERDAY WITH NO DRESSING. RIGHT OLD BKA. OBESES.
ON HEART MONITOR, ON LOVENOX. REFUSES SCD. ON 2L PER NC. LEFT AC PIV SEEN WITH
SALINE LOCK. INSULIN PUMP IN USE, PATIENT STATES SHE CHECKS HER OWN SUGAR AND
COVERS HERSELF. GLASSES ON. UPPER DENTURES IN CUP AT SINK. CALL LIGHT IN USE.